# Patient Record
Sex: FEMALE | HISPANIC OR LATINO | URBAN - METROPOLITAN AREA
[De-identification: names, ages, dates, MRNs, and addresses within clinical notes are randomized per-mention and may not be internally consistent; named-entity substitution may affect disease eponyms.]

---

## 2020-09-12 ENCOUNTER — EMERGENCY (EMERGENCY)
Facility: HOSPITAL | Age: 50
LOS: 1 days | Discharge: HOME | End: 2020-09-12
Attending: STUDENT IN AN ORGANIZED HEALTH CARE EDUCATION/TRAINING PROGRAM
Payer: MEDICAID

## 2020-09-12 VITALS
HEIGHT: 65 IN | OXYGEN SATURATION: 98 % | SYSTOLIC BLOOD PRESSURE: 137 MMHG | RESPIRATION RATE: 16 BRPM | HEART RATE: 86 BPM | WEIGHT: 182.98 LBS | DIASTOLIC BLOOD PRESSURE: 87 MMHG | TEMPERATURE: 98 F

## 2020-09-12 LAB
APPEARANCE UR: CLEAR — SIGNIFICANT CHANGE UP
BACTERIA # UR AUTO: ABNORMAL /HPF
BILIRUB UR-MCNC: NEGATIVE — SIGNIFICANT CHANGE UP
COLOR SPEC: YELLOW — SIGNIFICANT CHANGE UP
COMMENT - URINE: SIGNIFICANT CHANGE UP
DIFF PNL FLD: NEGATIVE — SIGNIFICANT CHANGE UP
EPI CELLS # UR: ABNORMAL /HPF
GLUCOSE UR QL: NEGATIVE — SIGNIFICANT CHANGE UP
HCG UR QL: NEGATIVE — SIGNIFICANT CHANGE UP
KETONES UR-MCNC: NEGATIVE — SIGNIFICANT CHANGE UP
LEUKOCYTE ESTERASE UR-ACNC: NEGATIVE — SIGNIFICANT CHANGE UP
NITRITE UR-MCNC: NEGATIVE — SIGNIFICANT CHANGE UP
PH UR: 5 — SIGNIFICANT CHANGE UP (ref 5–8)
PROT UR-MCNC: 15
RBC CASTS # UR COMP ASSIST: SIGNIFICANT CHANGE UP /HPF (ref 0–2)
SP GR SPEC: 1.02 — SIGNIFICANT CHANGE UP (ref 1.01–1.02)
UROBILINOGEN FLD QL: NEGATIVE — SIGNIFICANT CHANGE UP
WBC UR QL: SIGNIFICANT CHANGE UP /HPF (ref 0–5)

## 2020-09-12 PROCEDURE — 76830 TRANSVAGINAL US NON-OB: CPT | Mod: 26

## 2020-09-12 PROCEDURE — 81001 URINALYSIS AUTO W/SCOPE: CPT

## 2020-09-12 PROCEDURE — 99285 EMERGENCY DEPT VISIT HI MDM: CPT | Mod: 25

## 2020-09-12 PROCEDURE — 76830 TRANSVAGINAL US NON-OB: CPT

## 2020-09-12 PROCEDURE — 81025 URINE PREGNANCY TEST: CPT

## 2020-09-12 PROCEDURE — 76856 US EXAM PELVIC COMPLETE: CPT

## 2020-09-12 PROCEDURE — 87086 URINE CULTURE/COLONY COUNT: CPT

## 2020-09-12 PROCEDURE — 76856 US EXAM PELVIC COMPLETE: CPT | Mod: 26

## 2020-09-12 PROCEDURE — 99284 EMERGENCY DEPT VISIT MOD MDM: CPT | Mod: 25

## 2020-09-12 NOTE — ED ADULT NURSE NOTE - NSIMPLEMENTINTERV_GEN_ALL_ED
Implemented All Universal Safety Interventions:  [place] to call system. Call bell, personal items and telephone within reach. Instruct patient to call for assistance. Room bathroom lighting operational. Non-slip footwear when patient is off stretcher. Physically safe environment: no spills, clutter or unnecessary equipment. Stretcher in lowest position, wheels locked, appropriate side rails in place.

## 2020-09-12 NOTE — ED PROVIDER NOTE - ATTENDING CONTRIBUTION TO CARE
Patient presenting with lower abd pain x 10 days  ambulatory  no ttp of abd, guarding or rebound  will obtain ua, assess pregnant, uti, us and reassess

## 2020-09-12 NOTE — ED PROVIDER NOTE - OBJECTIVE STATEMENT
51 yo female with no PMHx presenting to ED with complaints of pelvic/vaginal pain with radiation to the back for 10 days. Patient states she has had a Mirena placed for 5 yrs. But over the last 10 days she has been feeling sharp pain in her lower abdomen with radiation to her lower back. Denies fever, n/v, vaginal bleeding or discharge.  [id number]

## 2020-09-12 NOTE — ED PROVIDER NOTE - PROGRESS NOTE DETAILS
Patient has Myomatous uterus, will FU with GYN as an out patient. Pt is well appearing walking with steady gait, stable for discharge and follow up without fail with medical doctor. I had a detailed discussion with the patient and/or guardian regarding the historical points, exam findings, and any diagnostic results supporting the discharge diagnosis. Pt educated on care and need for follow up. Strict return instructions and red flag signs and symptoms discussed with patient. Questions answered. Pt shows understanding of discharge information and agrees to follow.

## 2020-09-12 NOTE — ED PROVIDER NOTE - PATIENT PORTAL LINK FT
You can access the FollowMyHealth Patient Portal offered by [facility] by registering at the following website: [email or web address]. By joining [facility]’s FollowMyHealth portal, you will also be able to view your health information using other applications (apps) compatible with our system.

## 2020-09-13 LAB
CULTURE RESULTS: SIGNIFICANT CHANGE UP
SPECIMEN SOURCE: SIGNIFICANT CHANGE UP

## 2022-03-03 PROBLEM — Z78.9 OTHER SPECIFIED HEALTH STATUS: Chronic | Status: ACTIVE | Noted: 2020-09-12

## 2022-04-08 ENCOUNTER — APPOINTMENT (OUTPATIENT)
Dept: INTERNAL MEDICINE | Facility: CLINIC | Age: 52
End: 2022-04-08
Payer: MEDICAID

## 2022-04-08 ENCOUNTER — LABORATORY RESULT (OUTPATIENT)
Age: 52
End: 2022-04-08

## 2022-04-08 ENCOUNTER — NON-APPOINTMENT (OUTPATIENT)
Age: 52
End: 2022-04-08

## 2022-04-08 VITALS
HEART RATE: 87 BPM | BODY MASS INDEX: 30.13 KG/M2 | WEIGHT: 192 LBS | DIASTOLIC BLOOD PRESSURE: 84 MMHG | RESPIRATION RATE: 12 BRPM | OXYGEN SATURATION: 96 % | HEIGHT: 66.93 IN | TEMPERATURE: 97.1 F | SYSTOLIC BLOOD PRESSURE: 128 MMHG

## 2022-04-08 DIAGNOSIS — Z00.00 ENCOUNTER FOR GENERAL ADULT MEDICAL EXAMINATION W/OUT ABNORMAL FINDINGS: ICD-10-CM

## 2022-04-08 DIAGNOSIS — J30.2 OTHER SEASONAL ALLERGIC RHINITIS: ICD-10-CM

## 2022-04-08 DIAGNOSIS — U07.1 COVID-19: ICD-10-CM

## 2022-04-08 DIAGNOSIS — Z72.89 OTHER PROBLEMS RELATED TO LIFESTYLE: ICD-10-CM

## 2022-04-08 DIAGNOSIS — Z82.49 FAMILY HISTORY OF ISCHEMIC HEART DISEASE AND OTHER DISEASES OF THE CIRCULATORY SYSTEM: ICD-10-CM

## 2022-04-08 DIAGNOSIS — Z78.9 OTHER SPECIFIED HEALTH STATUS: ICD-10-CM

## 2022-04-08 DIAGNOSIS — Z91.89 OTHER SPECIFIED PERSONAL RISK FACTORS, NOT ELSEWHERE CLASSIFIED: ICD-10-CM

## 2022-04-08 DIAGNOSIS — Z83.3 FAMILY HISTORY OF DIABETES MELLITUS: ICD-10-CM

## 2022-04-08 PROCEDURE — 99386 PREV VISIT NEW AGE 40-64: CPT | Mod: 25

## 2022-04-08 PROCEDURE — 93000 ELECTROCARDIOGRAM COMPLETE: CPT

## 2022-04-08 NOTE — PHYSICAL EXAM

## 2022-04-09 LAB
APPEARANCE: CLEAR
BILIRUBIN URINE: NEGATIVE
BLOOD URINE: NEGATIVE
COLOR: YELLOW
GLUCOSE QUALITATIVE U: NEGATIVE
KETONES URINE: NEGATIVE
LEUKOCYTE ESTERASE URINE: ABNORMAL
NITRITE URINE: NEGATIVE
PH URINE: 6.5
PROTEIN URINE: NORMAL
SPECIFIC GRAVITY URINE: 1.02
UROBILINOGEN URINE: NORMAL

## 2022-04-12 LAB
25(OH)D3 SERPL-MCNC: 20.2 NG/ML
ALBUMIN SERPL ELPH-MCNC: 4.3 G/DL
ALP BLD-CCNC: 156 U/L
ALT SERPL-CCNC: 37 U/L
ANION GAP SERPL CALC-SCNC: 21 MMOL/L
AST SERPL-CCNC: 32 U/L
BILIRUB SERPL-MCNC: 0.3 MG/DL
BUN SERPL-MCNC: 11 MG/DL
CALCIUM SERPL-MCNC: 9.5 MG/DL
CHLORIDE SERPL-SCNC: 105 MMOL/L
CHOLEST SERPL-MCNC: 186 MG/DL
CO2 SERPL-SCNC: 17 MMOL/L
CREAT SERPL-MCNC: 0.67 MG/DL
EGFR: 106 ML/MIN/1.73M2
GLUCOSE SERPL-MCNC: 57 MG/DL
HDLC SERPL-MCNC: 58 MG/DL
LDLC SERPL CALC-MCNC: 117 MG/DL
NONHDLC SERPL-MCNC: 128 MG/DL
POTASSIUM SERPL-SCNC: 4.7 MMOL/L
PROT SERPL-MCNC: 7.6 G/DL
SODIUM SERPL-SCNC: 142 MMOL/L
TRIGL SERPL-MCNC: 57 MG/DL
TSH SERPL-ACNC: 1.62 UIU/ML

## 2022-04-13 LAB
BASOPHILS # BLD AUTO: 0.05 K/UL
BASOPHILS NFR BLD AUTO: 0.8 %
EOSINOPHIL # BLD AUTO: 0.26 K/UL
EOSINOPHIL NFR BLD AUTO: 4 %
HCT VFR BLD CALC: 53.8 %
HGB BLD-MCNC: 16.6 G/DL
IMM GRANULOCYTES NFR BLD AUTO: 0.3 %
LYMPHOCYTES # BLD AUTO: 1.9 K/UL
LYMPHOCYTES NFR BLD AUTO: 29.4 %
MAN DIFF?: NORMAL
MCHC RBC-ENTMCNC: 26.7 PG
MCHC RBC-ENTMCNC: 30.9 GM/DL
MCV RBC AUTO: 86.5 FL
MONOCYTES # BLD AUTO: 0.7 K/UL
MONOCYTES NFR BLD AUTO: 10.8 %
NEUTROPHILS # BLD AUTO: 3.53 K/UL
NEUTROPHILS NFR BLD AUTO: 54.7 %
PLATELET # BLD AUTO: 263 K/UL
RBC # BLD: 6.22 M/UL
RBC # FLD: 14.5 %
WBC # FLD AUTO: 6.46 K/UL

## 2022-05-27 ENCOUNTER — APPOINTMENT (OUTPATIENT)
Dept: INTERNAL MEDICINE | Facility: CLINIC | Age: 52
End: 2022-05-27

## 2022-07-01 ENCOUNTER — APPOINTMENT (OUTPATIENT)
Dept: GASTROENTEROLOGY | Facility: CLINIC | Age: 52
End: 2022-07-01

## 2022-08-19 ENCOUNTER — APPOINTMENT (OUTPATIENT)
Dept: INTERNAL MEDICINE | Facility: CLINIC | Age: 52
End: 2022-08-19

## 2022-10-04 ENCOUNTER — APPOINTMENT (OUTPATIENT)
Dept: INTERNAL MEDICINE | Facility: CLINIC | Age: 52
End: 2022-10-04

## 2022-10-04 VITALS
DIASTOLIC BLOOD PRESSURE: 87 MMHG | WEIGHT: 190 LBS | HEIGHT: 66.93 IN | SYSTOLIC BLOOD PRESSURE: 145 MMHG | BODY MASS INDEX: 29.82 KG/M2 | TEMPERATURE: 97.7 F | HEART RATE: 93 BPM | OXYGEN SATURATION: 98 % | RESPIRATION RATE: 12 BRPM

## 2022-10-04 DIAGNOSIS — Z00.8 ENCOUNTER FOR OTHER GENERAL EXAMINATION: ICD-10-CM

## 2022-10-04 DIAGNOSIS — Z11.1 ENCOUNTER FOR SCREENING FOR RESPIRATORY TUBERCULOSIS: ICD-10-CM

## 2022-10-04 DIAGNOSIS — Z23 ENCOUNTER FOR IMMUNIZATION: ICD-10-CM

## 2022-10-04 LAB
ALBUMIN SERPL ELPH-MCNC: 4.4 G/DL
ALP BLD-CCNC: 172 U/L
ALT SERPL-CCNC: 36 U/L
ANION GAP SERPL CALC-SCNC: 15 MMOL/L
AST SERPL-CCNC: 27 U/L
BASOPHILS # BLD AUTO: 0.06 K/UL
BASOPHILS NFR BLD AUTO: 1 %
BILIRUB SERPL-MCNC: 0.5 MG/DL
BUN SERPL-MCNC: 15 MG/DL
CALCIUM SERPL-MCNC: 9.6 MG/DL
CHLORIDE SERPL-SCNC: 103 MMOL/L
CO2 SERPL-SCNC: 22 MMOL/L
CREAT SERPL-MCNC: 0.67 MG/DL
EGFR: 106 ML/MIN/1.73M2
EOSINOPHIL # BLD AUTO: 0.2 K/UL
EOSINOPHIL NFR BLD AUTO: 3.4 %
GLUCOSE SERPL-MCNC: 111 MG/DL
HCT VFR BLD CALC: 51.7 %
HGB BLD-MCNC: 16.5 G/DL
IMM GRANULOCYTES NFR BLD AUTO: 0.2 %
LYMPHOCYTES # BLD AUTO: 2.18 K/UL
LYMPHOCYTES NFR BLD AUTO: 36.9 %
MAN DIFF?: NORMAL
MCHC RBC-ENTMCNC: 26.8 PG
MCHC RBC-ENTMCNC: 31.9 GM/DL
MCV RBC AUTO: 83.9 FL
MONOCYTES # BLD AUTO: 0.65 K/UL
MONOCYTES NFR BLD AUTO: 11 %
NEUTROPHILS # BLD AUTO: 2.81 K/UL
NEUTROPHILS NFR BLD AUTO: 47.5 %
PLATELET # BLD AUTO: 273 K/UL
POTASSIUM SERPL-SCNC: 3.9 MMOL/L
PROT SERPL-MCNC: 7.7 G/DL
RBC # BLD: 6.16 M/UL
RBC # FLD: 13.6 %
SODIUM SERPL-SCNC: 140 MMOL/L
WBC # FLD AUTO: 5.91 K/UL

## 2022-10-04 PROCEDURE — 90715 TDAP VACCINE 7 YRS/> IM: CPT

## 2022-10-04 PROCEDURE — 90471 IMMUNIZATION ADMIN: CPT

## 2022-10-04 NOTE — ASSESSMENT
[FreeTextEntry1] : POLYCYTHEMIA= LABS \par FORM FOR WORK,LABS AND TD TODAY \par TB SCREENING \par RTO AS PER RESULTS

## 2022-10-04 NOTE — PHYSICAL EXAM
[Scar] : a scar was noted [Soft, Nontender] : the abdomen was soft and nontender [Normal] : normal to percussion

## 2022-10-04 NOTE — HISTORY OF PRESENT ILLNESS
[de-identified] : PT COMES FOR WORK PHYSICAL EXAM AND LABS \par SEEN BY ONCOLOGY X2 FOR OVARIAN CYST S/P Aspiration = NEG FOR MALIGNANCY ;TOLD BY HIM TO SEE ME FOR ABNORMAL LABS BUT DID NOT SPECIFY OR GAVE PT REPORTS

## 2022-10-05 LAB
MEV IGG FLD QL IA: 280 AU/ML
MEV IGG+IGM SER-IMP: POSITIVE
MUV AB SER-ACNC: POSITIVE
MUV IGG SER QL IA: 98.4 AU/ML
RUBV IGG FLD-ACNC: 3.2 INDEX
RUBV IGG SER-IMP: POSITIVE
VZV AB TITR SER: POSITIVE
VZV IGG SER IF-ACNC: 624.1 INDEX

## 2022-10-06 LAB
M TB IFN-G BLD-IMP: NEGATIVE
QUANTIFERON TB PLUS MITOGEN MINUS NIL: >10 IU/ML
QUANTIFERON TB PLUS NIL: 0.03 IU/ML
QUANTIFERON TB PLUS TB1 MINUS NIL: 0.01 IU/ML
QUANTIFERON TB PLUS TB2 MINUS NIL: 0.01 IU/ML

## 2022-10-24 LAB
AMPHET UR-MCNC: NEGATIVE
BARBITURATES UR-MCNC: NEGATIVE
BENZODIAZ UR-MCNC: NEGATIVE
COCAINE METAB.OTHER UR-MCNC: NEGATIVE
CREATININE, URINE: 144.4 MG/DL
METHADONE UR-MCNC: NEGATIVE
METHAQUALONE UR-MCNC: NEGATIVE
OPIATES UR-MCNC: NEGATIVE
PCP UR-MCNC: NEGATIVE
PROPOXYPH UR QL: NEGATIVE
THC UR QL: NEGATIVE

## 2022-11-25 ENCOUNTER — APPOINTMENT (OUTPATIENT)
Dept: INTERNAL MEDICINE | Facility: CLINIC | Age: 52
End: 2022-11-25

## 2022-12-20 ENCOUNTER — APPOINTMENT (OUTPATIENT)
Dept: INTERNAL MEDICINE | Facility: CLINIC | Age: 52
End: 2022-12-20

## 2022-12-20 VITALS
WEIGHT: 188 LBS | DIASTOLIC BLOOD PRESSURE: 83 MMHG | BODY MASS INDEX: 29.51 KG/M2 | HEART RATE: 93 BPM | RESPIRATION RATE: 12 BRPM | OXYGEN SATURATION: 99 % | HEIGHT: 66.93 IN | SYSTOLIC BLOOD PRESSURE: 153 MMHG

## 2022-12-20 VITALS — SYSTOLIC BLOOD PRESSURE: 145 MMHG | DIASTOLIC BLOOD PRESSURE: 80 MMHG

## 2022-12-20 PROCEDURE — 99214 OFFICE O/P EST MOD 30 MIN: CPT

## 2022-12-20 NOTE — HISTORY OF PRESENT ILLNESS
[de-identified] : COMES FOR F/U \par SEEN BY GI BUT LOST REFERRAL AND WILL LIKE 2ND OPINION \par HAD NOT SEEN HEMATOLOGY YET \par CONCERN ABOUT ELEVATED BP READINGS

## 2022-12-20 NOTE — ASSESSMENT
[FreeTextEntry1] : 52 Y OLD FEM WITH BORDERLINE HTN = DIET AND EXERCISE \par POLYCYTHEMIA = HEMATOLOGY REF\par SCREENING COLON CA= GI EVAL \par RTO 4 M

## 2022-12-20 NOTE — PHYSICAL EXAM
[No CVA Tenderness] : no CVA  tenderness [Coordination Grossly Intact] : coordination grossly intact [Normal] : affect was normal and insight and judgment were intact

## 2022-12-23 ENCOUNTER — APPOINTMENT (OUTPATIENT)
Dept: GASTROENTEROLOGY | Facility: CLINIC | Age: 52
End: 2022-12-23

## 2022-12-23 VITALS
HEIGHT: 66.93 IN | HEART RATE: 98 BPM | SYSTOLIC BLOOD PRESSURE: 130 MMHG | BODY MASS INDEX: 29.51 KG/M2 | DIASTOLIC BLOOD PRESSURE: 76 MMHG | WEIGHT: 188 LBS | OXYGEN SATURATION: 95 % | RESPIRATION RATE: 12 BRPM

## 2022-12-23 DIAGNOSIS — Z12.11 ENCOUNTER FOR SCREENING FOR MALIGNANT NEOPLASM OF COLON: ICD-10-CM

## 2022-12-23 DIAGNOSIS — D58.2 OTHER HEMOGLOBINOPATHIES: ICD-10-CM

## 2022-12-23 PROCEDURE — 99203 OFFICE O/P NEW LOW 30 MIN: CPT

## 2022-12-23 NOTE — ASSESSMENT
[FreeTextEntry1] : 51 yo female, requests colon cancer screening. Has Hx of peritonitis s/p AP. Had evaluation in [place], but did not bring records. Via  tanya had benign ovarian cyst removed. There is no weight loss, no change in bowel habits, no family hx of colon cancer. There is no rectal bleeding.\par \par IMP:\par \par 1. average risk for colon cancer screening\par 2. hx ovarian cyst\par 3. hx of peritonitis\par \par PLAN:\par \par She was advised to undergo colonoscopy to which she  agreed. The procedure will be performed in [facility] with the assistance of an anesthesiologist. The procedure was explained in detail and she understood the risks of the procedure not limited  to infection, bleeding, perforation, risk of anesthesia and risk of IV site problems,emergency surgery, missed lesions  or non-diagnosis of colon cancer. She  was advised that she could not drive home alone, if the patient chooses to receive sedation. Further diagnostic and treatment recommendations will be based upon the procedure and any biopsies, if they are taken.\par \par pt will forward previous records to me

## 2022-12-23 NOTE — HISTORY OF PRESENT ILLNESS
[FreeTextEntry1] : 51 yo female, requests colon cancer screening. Has Hx of peritonitis s/p AP. Had evaluation in [place], but did not bring records. Via  tanya had benign ovarian cyst removed. There is no weight loss, no change in bowel habits, no family hx of colon cancer. There is no rectal bleeding.

## 2022-12-23 NOTE — PHYSICAL EXAM
[Alert] : alert [Normal Voice/Communication] : normal voice/communication [Healthy Appearing] : healthy appearing [No Acute Distress] : no acute distress [Sclera] : the sclera and conjunctiva were normal [Hearing Threshold Finger Rub Not [place]] : hearing was normal [Normal Lips/Gums] : the lips and gums were normal [Oropharynx] : the oropharynx was normal [Normal Appearance] : the appearance of the neck was normal [No Neck Mass] : no neck mass was observed [No Respiratory Distress] : no respiratory distress [No Acc Muscle Use] : no accessory muscle use [Respiration, Rhythm And Depth] : normal respiratory rhythm and effort [Heart Rate And Rhythm] : heart rate was normal and rhythm regular [Auscultation Breath Sounds / Voice Sounds] : lungs were clear to auscultation bilaterally [Normal S1, S2] : normal S1 and S2 [Murmurs] : no murmurs [Bowel Sounds] : normal bowel sounds [Abdomen Tenderness] : non-tender [No Masses] : no abdominal mass palpated [Abdomen Soft] : soft [] : no hepatosplenomegaly [Oriented To Time, Place, And Person] : oriented to person, place, and time

## 2023-02-27 ENCOUNTER — APPOINTMENT (OUTPATIENT)
Dept: GASTROENTEROLOGY | Facility: AMBULATORY SURGERY CENTER | Age: 53
End: 2023-02-27
Payer: MEDICAID

## 2023-02-27 PROCEDURE — 45378 DIAGNOSTIC COLONOSCOPY: CPT

## 2023-02-27 RX ORDER — POLYETHYLENE GLYCOL-3350 AND ELECTROLYTES 236; 6.74; 5.86; 2.97; 22.74 G/274.31G; G/274.31G; G/274.31G; G/274.31G; G/274.31G
236 POWDER, FOR SOLUTION ORAL
Qty: 4000 | Refills: 0 | Status: COMPLETED | COMMUNITY
Start: 2023-02-23 | End: 2023-02-27

## 2023-04-20 ENCOUNTER — APPOINTMENT (OUTPATIENT)
Dept: INTERNAL MEDICINE | Facility: CLINIC | Age: 53
End: 2023-04-20

## 2023-09-06 ENCOUNTER — APPOINTMENT (OUTPATIENT)
Dept: CARDIOLOGY | Facility: CLINIC | Age: 53
End: 2023-09-06
Payer: COMMERCIAL

## 2023-09-06 ENCOUNTER — NON-APPOINTMENT (OUTPATIENT)
Age: 53
End: 2023-09-06

## 2023-09-06 VITALS
SYSTOLIC BLOOD PRESSURE: 144 MMHG | WEIGHT: 176 LBS | HEART RATE: 94 BPM | BODY MASS INDEX: 27.62 KG/M2 | TEMPERATURE: 97.2 F | OXYGEN SATURATION: 98 % | DIASTOLIC BLOOD PRESSURE: 84 MMHG | RESPIRATION RATE: 14 BRPM

## 2023-09-06 LAB
ALBUMIN SERPL ELPH-MCNC: 4.7 G/DL
ALP BLD-CCNC: 167 U/L
ALT SERPL-CCNC: 35 U/L
ANION GAP SERPL CALC-SCNC: 14 MMOL/L
APPEARANCE: CLEAR
AST SERPL-CCNC: 25 U/L
BILIRUB SERPL-MCNC: 0.4 MG/DL
BILIRUBIN URINE: NEGATIVE
BLOOD URINE: NEGATIVE
BUN SERPL-MCNC: 14 MG/DL
CALCIUM SERPL-MCNC: 10 MG/DL
CHLORIDE SERPL-SCNC: 103 MMOL/L
CO2 SERPL-SCNC: 24 MMOL/L
COLOR: YELLOW
CREAT SERPL-MCNC: 0.67 MG/DL
EGFR: 105 ML/MIN/1.73M2
GLUCOSE QUALITATIVE U: NEGATIVE MG/DL
GLUCOSE SERPL-MCNC: 97 MG/DL
HCG SERPL-MCNC: 2 MIU/ML
HCT VFR BLD CALC: 49.4 %
HGB BLD-MCNC: 15.7 G/DL
INR PPP: 0.93 RATIO
KETONES URINE: NEGATIVE MG/DL
LEUKOCYTE ESTERASE URINE: NEGATIVE
MCHC RBC-ENTMCNC: 27.4 PG
MCHC RBC-ENTMCNC: 31.8 GM/DL
MCV RBC AUTO: 86.4 FL
NITRITE URINE: NEGATIVE
PH URINE: 6
PLATELET # BLD AUTO: 251 K/UL
POTASSIUM SERPL-SCNC: 4.2 MMOL/L
PROT SERPL-MCNC: 7.3 G/DL
PROTEIN URINE: NEGATIVE MG/DL
PT BLD: 10.5 SEC
RBC # BLD: 5.72 M/UL
RBC # FLD: 14.2 %
SODIUM SERPL-SCNC: 142 MMOL/L
SPECIFIC GRAVITY URINE: 1.02
UROBILINOGEN URINE: 0.2 MG/DL
WBC # FLD AUTO: 5.54 K/UL

## 2023-09-06 PROCEDURE — 99203 OFFICE O/P NEW LOW 30 MIN: CPT | Mod: 25

## 2023-09-06 PROCEDURE — 93000 ELECTROCARDIOGRAM COMPLETE: CPT

## 2023-09-06 NOTE — HISTORY OF PRESENT ILLNESS
[Preoperative Visit] : for a medical evaluation prior to surgery [Scheduled Procedure ___] : a [unfilled] [Date of Surgery ___] : on [unfilled] [Surgeon Name ___] : surgeon: [unfilled] [FreeTextEntry1] : Kelsey is a 53-year-old Bangladeshi speaking female who presents for clearance prior to abdominal surgery as defined. No CP, palpitations or SOB. No other medical problems. She is admittedly very nervous today.

## 2023-09-06 NOTE — DISCUSSION/SUMMARY
[Procedure Intermediate Risk] : the procedure risk is intermediate [Patient Low Risk] : the patient is a low surgical risk [Optimized for Surgery] : the patient is optimized for surgery [FreeTextEntry1] : The patient is a 53-year-old Gambian speaking female awaiting surgery who is asymptomatic.  #1 CV- normal ECG but tachycardia secondary to anxiety which improved during our visit.  #2 General- There are no cardiac contraindications to exp. lap, with extensive lysis of adhesions as planned. Labs were sent for Dr. Lal prior to their visit tomorrow.

## 2023-09-06 NOTE — PHYSICAL EXAM
[General Appearance - Well Developed] : well developed [Normal Appearance] : normal appearance [Well Groomed] : well groomed [General Appearance - Well Nourished] : well nourished [No Deformities] : no deformities [General Appearance - In No Acute Distress] : no acute distress [Normal Conjunctiva] : the conjunctiva exhibited no abnormalities [Eyelids - No Xanthelasma] : the eyelids demonstrated no xanthelasmas [Normal Oral Mucosa] : normal oral mucosa [No Oral Pallor] : no oral pallor [No Oral Cyanosis] : no oral cyanosis [Normal Jugular Venous A Waves Present] : normal jugular venous A waves present [Normal Jugular Venous V Waves Present] : normal jugular venous V waves present [No Jugular Venous [place] A Waves] : no jugular venous [place] A waves [Respiration, Rhythm And Depth] : normal respiratory rhythm and effort [Exaggerated Use Of Accessory Muscles For Inspiration] : no accessory muscle use [Auscultation Breath Sounds / Voice Sounds] : lungs were clear to auscultation bilaterally [Heart Rate And Rhythm] : heart rate and rhythm were normal [Heart Sounds] : normal S1 and S2 [Murmurs] : no murmurs present [Abdomen Soft] : soft [Abdomen Tenderness] : non-tender [Abdomen Mass (___ Cm)] : no abdominal mass palpated [Abnormal Walk] : normal gait [Gait - Sufficient For Exercise Testing] : the gait was sufficient for exercise testing [Nail Clubbing] : no clubbing of the fingernails [Cyanosis, Localized] : no localized cyanosis [Petechial Hemorrhages (___cm)] : no petechial hemorrhages [Skin Color & Pigmentation] : normal skin color and pigmentation [] : no rash [No Venous Stasis] : no venous stasis [Skin Lesions] : no skin lesions [No Skin Ulcers] : no skin ulcer [No Xanthoma] : no  xanthoma was observed [Oriented To Time, Place, And Person] : oriented to person, place, and time [Affect] : the affect was normal [Mood] : the mood was normal [No Anxiety] : not feeling anxious

## 2023-09-07 ENCOUNTER — APPOINTMENT (OUTPATIENT)
Dept: INTERNAL MEDICINE | Facility: CLINIC | Age: 53
End: 2023-09-07
Payer: COMMERCIAL

## 2023-09-07 VITALS
TEMPERATURE: 97 F | HEIGHT: 66.93 IN | DIASTOLIC BLOOD PRESSURE: 77 MMHG | HEART RATE: 91 BPM | SYSTOLIC BLOOD PRESSURE: 129 MMHG | BODY MASS INDEX: 29.03 KG/M2 | OXYGEN SATURATION: 96 % | WEIGHT: 185 LBS | RESPIRATION RATE: 14 BRPM

## 2023-09-07 DIAGNOSIS — D75.1 SECONDARY POLYCYTHEMIA: ICD-10-CM

## 2023-09-07 DIAGNOSIS — D25.9 LEIOMYOMA OF UTERUS, UNSPECIFIED: ICD-10-CM

## 2023-09-07 DIAGNOSIS — N83.209 UNSPECIFIED OVARIAN CYST, UNSPECIFIED SIDE: ICD-10-CM

## 2023-09-07 DIAGNOSIS — Z01.818 ENCOUNTER FOR OTHER PREPROCEDURAL EXAMINATION: ICD-10-CM

## 2023-09-07 LAB — ABO + RH PNL BLD: NORMAL

## 2023-09-07 PROCEDURE — 99215 OFFICE O/P EST HI 40 MIN: CPT

## 2023-09-07 NOTE — REVIEW OF SYSTEMS
[Abdominal Pain] : abdominal pain [Nocturia] : nocturia [Frequency] : frequency [Negative] : Heme/Lymph

## 2023-09-07 NOTE — HISTORY OF PRESENT ILLNESS
[No Pertinent Cardiac History] : no history of aortic stenosis, atrial fibrillation, coronary artery disease, recent myocardial infarction, or implantable device/pacemaker [No Pertinent Pulmonary History] : no history of asthma, COPD, sleep apnea, or smoking [No Adverse Anesthesia Reaction] : no adverse anesthesia reaction in self or family member [FreeTextEntry1] : LAUREN ,LEFT OOPHORECTOMY AND LYSIS OF ADHESIONS  [FreeTextEntry2] : 9/28/23 [FreeTextEntry3] : DR FISCHER-  [FreeTextEntry4] : PT WILL UNDERGO LAPARATOMY ,HYSTECTOMY AND LYSIS OF ADHESIONS S/P ABD SURGERY AND WITH UTERINE FIBROID WITH  SX

## 2023-09-07 NOTE — PHYSICAL EXAM
[No Acute Distress] : no acute distress [Normal Sclera/Conjunctiva] : normal sclera/conjunctiva [Rounded] : rounded [Normal] : normal [Scar] : a scar was noted [Soft, Nontender] : the abdomen was soft and nontender [No Mass] : no masses were palpated [No HSM] : no hepatosplenomegaly noted [No Joint Swelling] : no joint swelling [No Rash] : no rash [Coordination Grossly Intact] : coordination grossly intact [No Focal Deficits] : no focal deficits [Normal Gait] : normal gait [Alert and Oriented x3] : oriented to person, place, and time

## 2023-09-07 NOTE — ASSESSMENT
[Patient Optimized for Surgery] : Patient optimized for surgery [No Further Testing Recommended] : no further testing recommended [As per surgery] : as per surgery [FreeTextEntry4] : 53 Y OLD FEM WITH MILD POLYCYTHEMIA AT ACCEPTABLE RISK FOR SURGERY

## 2023-09-27 ENCOUNTER — TRANSCRIPTION ENCOUNTER (OUTPATIENT)
Age: 53
End: 2023-09-27

## 2023-09-27 VITALS
RESPIRATION RATE: 16 BRPM | OXYGEN SATURATION: 98 % | WEIGHT: 179.24 LBS | SYSTOLIC BLOOD PRESSURE: 136 MMHG | TEMPERATURE: 97 F | HEIGHT: 65 IN | HEART RATE: 103 BPM | DIASTOLIC BLOOD PRESSURE: 73 MMHG

## 2023-09-27 RX ORDER — HEPARIN SODIUM 5000 [USP'U]/ML
5000 INJECTION INTRAVENOUS; SUBCUTANEOUS ONCE
Refills: 0 | Status: COMPLETED | OUTPATIENT
Start: 2023-09-28 | End: 2023-09-28

## 2023-09-27 RX ORDER — CELECOXIB 200 MG/1
400 CAPSULE ORAL ONCE
Refills: 0 | Status: COMPLETED | OUTPATIENT
Start: 2023-09-28 | End: 2023-09-28

## 2023-09-27 RX ORDER — ACETAMINOPHEN 500 MG
1000 TABLET ORAL ONCE
Refills: 0 | Status: COMPLETED | OUTPATIENT
Start: 2023-09-28 | End: 2023-09-28

## 2023-09-27 NOTE — PRE-OP CHECKLIST - 2.
Pt took Neomycin 500mg and Metronidazole 500mg as per Dr. Granados - Pt had vomitting, diarrhea and headache

## 2023-09-27 NOTE — PATIENT PROFILE ADULT - FALL HARM RISK - UNIVERSAL INTERVENTIONS
Bed in lowest position, wheels locked, appropriate side rails in place/Call bell, personal items and telephone in reach/Instruct patient to call for assistance before getting out of bed or chair/Non-slip footwear when patient is out of bed/[place] to call system/Physically safe environment - no spills, clutter or unnecessary equipment/Purposeful Proactive Rounding/Room/bathroom lighting operational, light cord in reach

## 2023-09-28 ENCOUNTER — TRANSCRIPTION ENCOUNTER (OUTPATIENT)
Age: 53
End: 2023-09-28

## 2023-09-28 ENCOUNTER — INPATIENT (INPATIENT)
Facility: HOSPITAL | Age: 53
LOS: 2 days | Discharge: HOME | DRG: 354 | End: 2023-10-01
Attending: SURGERY | Admitting: SURGERY
Payer: COMMERCIAL

## 2023-09-28 DIAGNOSIS — Z98.890 OTHER SPECIFIED POSTPROCEDURAL STATES: Chronic | ICD-10-CM

## 2023-09-28 DIAGNOSIS — Z90.721 ACQUIRED ABSENCE OF OVARIES, UNILATERAL: Chronic | ICD-10-CM

## 2023-09-28 DIAGNOSIS — Z87.81 PERSONAL HISTORY OF (HEALED) TRAUMATIC FRACTURE: Chronic | ICD-10-CM

## 2023-09-28 DIAGNOSIS — Z90.49 ACQUIRED ABSENCE OF OTHER SPECIFIED PARTS OF DIGESTIVE TRACT: Chronic | ICD-10-CM

## 2023-09-28 LAB
BLD GP AB SCN SERPL QL: NEGATIVE — SIGNIFICANT CHANGE UP
RH IG SCN BLD-IMP: POSITIVE — SIGNIFICANT CHANGE UP

## 2023-09-28 PROCEDURE — 52005 CYSTO W/URTRL CATHJ: CPT

## 2023-09-28 PROCEDURE — 88304 TISSUE EXAM BY PATHOLOGIST: CPT | Mod: 26

## 2023-09-28 PROCEDURE — 88307 TISSUE EXAM BY PATHOLOGIST: CPT | Mod: 26

## 2023-09-28 DEVICE — SEPRAFILM 3 X 5": Type: IMPLANTABLE DEVICE | Status: FUNCTIONAL

## 2023-09-28 DEVICE — MESH HERNIA VENTRAL / INCISIONAL PARIETEX PROGRIP 30 X 15CM: Type: IMPLANTABLE DEVICE | Status: FUNCTIONAL

## 2023-09-28 RX ORDER — SODIUM CHLORIDE 9 MG/ML
500 INJECTION, SOLUTION INTRAVENOUS ONCE
Refills: 0 | Status: COMPLETED | OUTPATIENT
Start: 2023-09-28 | End: 2023-09-28

## 2023-09-28 RX ORDER — ONDANSETRON 8 MG/1
4 TABLET, FILM COATED ORAL EVERY 6 HOURS
Refills: 0 | Status: DISCONTINUED | OUTPATIENT
Start: 2023-09-28 | End: 2023-10-01

## 2023-09-28 RX ORDER — HEPARIN SODIUM 5000 [USP'U]/ML
5000 INJECTION INTRAVENOUS; SUBCUTANEOUS EVERY 8 HOURS
Refills: 0 | Status: DISCONTINUED | OUTPATIENT
Start: 2023-09-28 | End: 2023-10-01

## 2023-09-28 RX ORDER — ACETAMINOPHEN 500 MG
650 TABLET ORAL EVERY 6 HOURS
Refills: 0 | Status: DISCONTINUED | OUTPATIENT
Start: 2023-09-28 | End: 2023-10-01

## 2023-09-28 RX ORDER — ACETAMINOPHEN 500 MG
1000 TABLET ORAL ONCE
Refills: 0 | Status: COMPLETED | OUTPATIENT
Start: 2023-09-28 | End: 2023-09-28

## 2023-09-28 RX ORDER — OXYCODONE HYDROCHLORIDE 5 MG/1
10 TABLET ORAL EVERY 6 HOURS
Refills: 0 | Status: DISCONTINUED | OUTPATIENT
Start: 2023-09-28 | End: 2023-09-29

## 2023-09-28 RX ORDER — OXYCODONE HYDROCHLORIDE 5 MG/1
5 TABLET ORAL EVERY 6 HOURS
Refills: 0 | Status: DISCONTINUED | OUTPATIENT
Start: 2023-09-28 | End: 2023-09-29

## 2023-09-28 RX ORDER — SODIUM CHLORIDE 9 MG/ML
1000 INJECTION, SOLUTION INTRAVENOUS
Refills: 0 | Status: DISCONTINUED | OUTPATIENT
Start: 2023-09-28 | End: 2023-09-30

## 2023-09-28 RX ORDER — HYDROMORPHONE HYDROCHLORIDE 2 MG/ML
0.25 INJECTION INTRAMUSCULAR; INTRAVENOUS; SUBCUTANEOUS
Refills: 0 | Status: DISCONTINUED | OUTPATIENT
Start: 2023-09-28 | End: 2023-09-29

## 2023-09-28 RX ADMIN — SODIUM CHLORIDE 1000 MILLILITER(S): 9 INJECTION, SOLUTION INTRAVENOUS at 21:58

## 2023-09-28 RX ADMIN — HYDROMORPHONE HYDROCHLORIDE 0.25 MILLIGRAM(S): 2 INJECTION INTRAMUSCULAR; INTRAVENOUS; SUBCUTANEOUS at 21:24

## 2023-09-28 RX ADMIN — Medication 1000 MILLIGRAM(S): at 21:58

## 2023-09-28 RX ADMIN — Medication 400 MILLIGRAM(S): at 21:40

## 2023-09-28 RX ADMIN — HEPARIN SODIUM 5000 UNIT(S): 5000 INJECTION INTRAVENOUS; SUBCUTANEOUS at 14:11

## 2023-09-28 RX ADMIN — OXYCODONE HYDROCHLORIDE 10 MILLIGRAM(S): 5 TABLET ORAL at 23:53

## 2023-09-28 RX ADMIN — CELECOXIB 400 MILLIGRAM(S): 200 CAPSULE ORAL at 14:11

## 2023-09-28 RX ADMIN — Medication 1000 MILLIGRAM(S): at 14:11

## 2023-09-28 RX ADMIN — HYDROMORPHONE HYDROCHLORIDE 0.25 MILLIGRAM(S): 2 INJECTION INTRAMUSCULAR; INTRAVENOUS; SUBCUTANEOUS at 21:58

## 2023-09-28 RX ADMIN — OXYCODONE HYDROCHLORIDE 10 MILLIGRAM(S): 5 TABLET ORAL at 22:53

## 2023-09-28 NOTE — BRIEF OPERATIVE NOTE - OPERATION/FINDINGS
Entry by gen surg - see their op note for details. Large fibroid uterus, 20w size. Abdominal AUGUSTINA/BSO using Ligasure. Cervical cuff closed with Vicryl. Excellent hemostasis. Bilateral ureters palpated distant for surgical sites. Remainder of case per gen surg.
Pre-op ureteral open ended catheter placement (5Fr yellow L ureter, Sibley whistle tip catheter R ureter)
Patient prepped and draped in stirrups, please see Urology note for operative findings of Urologic stent placement. Patient supine prepped and draped in sterile fashion. Midline incision made from umbilicus to pubic symphysis, incision dissected with electrocautery to hernia sac, defect appreciated in rectus muscle. No bowel ischemia noted. Supracervical abdominal hysterectomy with bilateral salpingo-oopherectomy completed by OBGYN, please see operative note for operative findings. Abdomen washed out thoroughly until irrigation clear. Posterior rectus fascia released and primarily re-approximated with nylon suture. Pro- mesh fixed with nylon suture. Rectus sheath closed primarily. Fascia closed with nylon. Deep dermals with vicryl. Skin closed with absorbable subcuticular skin staples.

## 2023-09-28 NOTE — H&P ADULT - NSICDXPASTSURGICALHX_GEN_ALL_CORE_FT
PAST SURGICAL HISTORY:  H/O inguinal hernia repair     H/O rhinoplasty     H/O unilateral oophorectomy left    History of appendectomy     History of fracture of clavicle left clavicle surgery

## 2023-09-28 NOTE — H&P ADULT - NSHPPHYSICALEXAM_GEN_ALL_CORE
T(C): 36.3 (09-28-23 @ 13:26), Max: 36.3 (09-28-23 @ 13:26)  HR: 103 (09-28-23 @ 13:26) (103 - 103)  BP: 136/73 (09-28-23 @ 13:26) (136/73 - 136/73)  RR: 16 (09-28-23 @ 13:26) (16 - 16)  SpO2: 98% (09-28-23 @ 13:26) (98% - 98%)    CONSTITUTIONAL: Well groomed, no apparent distress  EYES: PERRLA and symmetric, EOMI, No conjunctival or scleral injection, non-icteric  ENMT: Oral mucosa with moist membranes. Normal dentition; no pharyngeal injection or exudates             NECK: Supple, symmetric and without tracheal deviation   RESP: No respiratory distress, no use of accessory muscles; CTA b/l, no WRR  CV: RRR, +S1S2, no MRG; no JVD; no peripheral edema  GI: incisional hernia palpated at midline, soft, distended, mild tenderness to deep palpation  LYMPH: No cervical LAD or tenderness; no axillary LAD or tenderness; no inguinal LAD or tenderness  MSK: Normal gait; No digital clubbing or cyanosis; examination of the (head/neck/spine/ribs/pelvis, RUE, LUE, RLE, LLE) without misalignment,            Normal ROM without pain, no spinal tenderness, normal muscle strength/tone  SKIN: No rashes or ulcers noted; no subcutaneous nodules or induration palpable  NEURO: CN II-XII intact; normal reflexes in upper and lower extremities, sensation intact in upper and lower extremities b/l to light touch   PSYCH: Appropriate insight/judgment; A+O x 3, mood and affect appropriate, recent/remote memory intact

## 2023-09-28 NOTE — BRIEF OPERATIVE NOTE - NSICDXBRIEFPROCEDURE_GEN_ALL_CORE_FT
PROCEDURES:  Supracervical abdominal hysterectomy with bilateral salpingo-oophorectomy 28-Sep-2023 18:39:27  Nhung Casas   PROCEDURES:  Supracervical abdominal hysterectomy with bilateral salpingo-oophorectomy 28-Sep-2023 18:39:27  Nhung aCsas

## 2023-09-28 NOTE — BRIEF OPERATIVE NOTE - NSICDXBRIEFPOSTOP_GEN_ALL_CORE_FT
POST-OP DIAGNOSIS:  Fibroid uterus 28-Sep-2023 18:42:17  Nhung Casas  Incisional hernia 28-Sep-2023 21:18:21  Jody Mcpherson  
POST-OP DIAGNOSIS:  Fibroid uterus 28-Sep-2023 18:42:17  Nhung Casas

## 2023-09-28 NOTE — PRE-ANESTHESIA EVALUATION ADULT - NSPROPOSEDPROCEDFT_GEN_ALL_CORE
exploratory laparotomy, extensive lysis of adhesiosn, open recurrent incarcerated 8cm hernia repair with mesh, bilateral component separation, staging laparoscopy, abdominal supracervical hysterectomy with right salpingectomy, left salingo-oophorectomy, cystoscopy, possible right salingo-oophorectomy,

## 2023-09-28 NOTE — H&P ADULT - ASSESSMENT
52 yo F PMH ovarian cyst, fibroid, inguinal hernia, extensive incisional hernia, PSH inguinal hernia repair, rhinoplasty, unilateral oophorectomy, appendectomy, presents on 9/28 for an incarcerated hernia repair with mesh, supracervical abdominal hysterectomy with bilateral salpingo-oophorectomy, cystoscopy with bilateral ureteral stent placement. Today patient states that she is feeling well with mild abdominal discomfort, she is cleared to proceed to surgery.    Proceed to OR 52 yo F PMH ovarian cyst, fibroid, inguinal hernia, extensive incisional hernia, PSH inguinal hernia repair, rhinoplasty, unilateral oophorectomy, appendectomy, presents on 9/28 for an incarcerated hernia repair with mesh, supracervical abdominal hysterectomy with bilateral salpingo-oophorectomy, cystoscopy with bilateral ureteral stent placement. Today patient states that she is feeling well with mild abdominal discomfort, she is cleared to proceed to surgery.    Proceed to OR  Admission to [facility]-op  ERAS protocol

## 2023-09-28 NOTE — H&P ADULT - HISTORY OF PRESENT ILLNESS
52 yo F PMH ovarian cyst, fibroid, inguinal hernia, extensive incisional hernia, PSH inguinal hernia repair, rhinoplasty, unilateral oophorectomy, appendectomy, presents on 9/28 for an incarcerated hernia repair with mesh, supracervical abdominal hysterectomy with bilateral salpingo-oophorectomy, cystoscopy with bilateral ureteral stent placement. Today patient states that she is feeling well with mild abdominal discomfort, she is cleared to proceed to surgery.   54 yo F PMH ovarian cyst, fibroid, inguinal hernia, extensive incisional hernia, PSH inguinal hernia repair, rhinoplasty, unilateral oophorectomy, appendectomy, IR drainage of ovarian cyst presents on 9/28 for an incarcerated hernia repair with mesh, supracervical abdominal hysterectomy with bilateral salpingo-oophorectomy, cystoscopy with bilateral ureteral stent placement. Patient states that she has had a complicated surgical history complicated by multiple incisional hernia repairs with mesh, she cannot state exactly how many. Her main complaint is discomfort caused by her uterine fibroid that she has had for years. Today patient states that she is feeling well with mild abdominal discomfort.

## 2023-09-28 NOTE — BRIEF OPERATIVE NOTE - NSICDXBRIEFPREOP_GEN_ALL_CORE_FT
PRE-OP DIAGNOSIS:  Fibroid uterus 28-Sep-2023 18:39:36  Nhung Casas  Incisional hernia 28-Sep-2023 21:18:12  Jody Mcpherson  
PRE-OP DIAGNOSIS:  Fibroid uterus 28-Sep-2023 18:39:36  Nhung Casas

## 2023-09-28 NOTE — PROGRESS NOTE ADULT - SUBJECTIVE AND OBJECTIVE BOX
Procedure: : incarcerated hernia repair with mesh, supracervical abdominal hysterectomy with bilateral salpingo-oophorectomy, cystoscopy with bilateral ureteral stent placement.    Surgeon: Dr. Granados    S: Pt has no complaints. Pain controlled with medication.  Denies CP, SOB, GREEN, calf tenderness.    O:  T(C): 37.1 (09-28-23 @ 20:47), Max: 37.1 (09-28-23 @ 20:47)  T(F): 98.7 (09-28-23 @ 20:47), Max: 98.7 (09-28-23 @ 20:47)  HR: 92 (09-28-23 @ 21:54) (92 - 99)  BP: 103/52 (09-28-23 @ 21:54) (96/51 - 107/56)  RR: 18 (09-28-23 @ 21:54) (12 - 20)  SpO2: 95% (09-28-23 @ 21:54) (95% - 98%)  Wt(kg): --            Gen: NAD, resting comfortably in bed  C/V: NSR  Pulm: Nonlabored breathing, no respiratory distress  Abd: soft, NT/ND Incision: c/d/i  Sebastian; yellow urine  Extrem: WWP, no calf edema, SCDs in place       Procedure: : incarcerated hernia repair with mesh, supracervical abdominal hysterectomy with bilateral salpingo-oophorectomy, cystoscopy with bilateral ureteral stent placement.    Surgeon: Dr. Granados    S: Pt has no complaints. Pain controlled with medication. Denies CP, SOB, GREEN, calf tenderness.    O:  T(C): 37.1 (09-28-23 @ 20:47), Max: 37.1 (09-28-23 @ 20:47)  T(F): 98.7 (09-28-23 @ 20:47), Max: 98.7 (09-28-23 @ 20:47)  HR: 92 (09-28-23 @ 21:54) (92 - 99)  BP: 103/52 (09-28-23 @ 21:54) (96/51 - 107/56)  RR: 18 (09-28-23 @ 21:54) (12 - 20)  SpO2: 95% (09-28-23 @ 21:54) (95% - 98%)  Wt(kg): --    Gen: NAD, resting comfortably in bed  C/V: NSR  Pulm: Nonlabored breathing, no respiratory distress  Abd: soft, NT/ND. No rebound or guarding  Incision: c/d/i  THA; x2 SS, to suction.    Sebastian; yellow urine  Extrem: WWP, no calf edema, SCDs in place

## 2023-09-28 NOTE — PROGRESS NOTE ADULT - ASSESSMENT
54 yo F PMH ovarian cyst, fibroid, inguinal hernia, extensive incisional hernia, PSH inguinal hernia repair, rhinoplasty, unilateral oophorectomy, appendectomy, presents on 9/28 for an incarcerated hernia repair with mesh, supracervical abdominal hysterectomy with bilateral salpingo-oophorectomy, cystoscopy with bilateral ureteral stent placement. POC WNL    Plan:  Ad[facility] to [facility]  ER[facility] protocol  CLD/IVF 40  SCD/SQH/OOBA/IS  Standing tylenol  AM labs  Sebastian  19 Fr devon  Gyn recs  Urology recs 52 yo F PMH ovarian cyst, fibroid, inguinal hernia, extensive incisional hernia, PSH inguinal hernia repair, rhinoplasty, unilateral oophorectomy, appendectomy, presents on 9/28 for an incarcerated hernia repair with mesh, supracervical abdominal hysterectomy with bilateral salpingo-oophorectomy, cystoscopy with bilateral ureteral stent placement. POC WNL    Plan:  ERAS protocol  CLD/IVF 40  SCD/SQH/OOBA/IS  Standing tylenol  AM labs  Sebastian  19 Fr devon  Gyn recs  Urology recs

## 2023-09-29 LAB
ANION GAP SERPL CALC-SCNC: 7 MMOL/L — SIGNIFICANT CHANGE UP (ref 5–17)
BUN SERPL-MCNC: 5 MG/DL — LOW (ref 7–23)
CALCIUM SERPL-MCNC: 8.9 MG/DL — SIGNIFICANT CHANGE UP (ref 8.4–10.5)
CHLORIDE SERPL-SCNC: 106 MMOL/L — SIGNIFICANT CHANGE UP (ref 96–108)
CO2 SERPL-SCNC: 29 MMOL/L — SIGNIFICANT CHANGE UP (ref 22–31)
CREAT SERPL-MCNC: 0.54 MG/DL — SIGNIFICANT CHANGE UP (ref 0.5–1.3)
EGFR: 111 ML/MIN/1.73M2 — SIGNIFICANT CHANGE UP
GLUCOSE SERPL-MCNC: 97 MG/DL — SIGNIFICANT CHANGE UP (ref 70–99)
HCT VFR BLD CALC: 38.8 % — SIGNIFICANT CHANGE UP (ref 34.5–45)
HCT VFR BLD CALC: 43.8 % — SIGNIFICANT CHANGE UP (ref 34.5–45)
HGB BLD-MCNC: 12.3 G/DL — SIGNIFICANT CHANGE UP (ref 11.5–15.5)
HGB BLD-MCNC: 13.7 G/DL — SIGNIFICANT CHANGE UP (ref 11.5–15.5)
MAGNESIUM SERPL-MCNC: 1.9 MG/DL — SIGNIFICANT CHANGE UP (ref 1.6–2.6)
MCHC RBC-ENTMCNC: 27.6 PG — SIGNIFICANT CHANGE UP (ref 27–34)
MCHC RBC-ENTMCNC: 27.9 PG — SIGNIFICANT CHANGE UP (ref 27–34)
MCHC RBC-ENTMCNC: 31.3 GM/DL — LOW (ref 32–36)
MCHC RBC-ENTMCNC: 31.7 GM/DL — LOW (ref 32–36)
MCV RBC AUTO: 88 FL — SIGNIFICANT CHANGE UP (ref 80–100)
MCV RBC AUTO: 88.3 FL — SIGNIFICANT CHANGE UP (ref 80–100)
NRBC # BLD: 0 /100 WBCS — SIGNIFICANT CHANGE UP (ref 0–0)
NRBC # BLD: 0 /100 WBCS — SIGNIFICANT CHANGE UP (ref 0–0)
PHOSPHATE SERPL-MCNC: 3.9 MG/DL — SIGNIFICANT CHANGE UP (ref 2.5–4.5)
PLATELET # BLD AUTO: 185 K/UL — SIGNIFICANT CHANGE UP (ref 150–400)
PLATELET # BLD AUTO: 222 K/UL — SIGNIFICANT CHANGE UP (ref 150–400)
POTASSIUM SERPL-MCNC: 3.9 MMOL/L — SIGNIFICANT CHANGE UP (ref 3.5–5.3)
POTASSIUM SERPL-SCNC: 3.9 MMOL/L — SIGNIFICANT CHANGE UP (ref 3.5–5.3)
RBC # BLD: 4.41 M/UL — SIGNIFICANT CHANGE UP (ref 3.8–5.2)
RBC # BLD: 4.96 M/UL — SIGNIFICANT CHANGE UP (ref 3.8–5.2)
RBC # FLD: 13.5 % — SIGNIFICANT CHANGE UP (ref 10.3–14.5)
RBC # FLD: 13.5 % — SIGNIFICANT CHANGE UP (ref 10.3–14.5)
SODIUM SERPL-SCNC: 142 MMOL/L — SIGNIFICANT CHANGE UP (ref 135–145)
WBC # BLD: 8.35 K/UL — SIGNIFICANT CHANGE UP (ref 3.8–10.5)
WBC # BLD: 8.57 K/UL — SIGNIFICANT CHANGE UP (ref 3.8–10.5)
WBC # FLD AUTO: 8.35 K/UL — SIGNIFICANT CHANGE UP (ref 3.8–10.5)
WBC # FLD AUTO: 8.57 K/UL — SIGNIFICANT CHANGE UP (ref 3.8–10.5)

## 2023-09-29 RX ORDER — LIDOCAINE HCL 20 MG/ML
5 VIAL (ML) INJECTION ONCE
Refills: 0 | Status: COMPLETED | OUTPATIENT
Start: 2023-09-29 | End: 2023-09-29

## 2023-09-29 RX ORDER — OXYCODONE HYDROCHLORIDE 5 MG/1
2.5 TABLET ORAL EVERY 8 HOURS
Refills: 0 | Status: DISCONTINUED | OUTPATIENT
Start: 2023-09-29 | End: 2023-10-01

## 2023-09-29 RX ORDER — OXYCODONE HYDROCHLORIDE 5 MG/1
5 TABLET ORAL EVERY 6 HOURS
Refills: 0 | Status: DISCONTINUED | OUTPATIENT
Start: 2023-09-29 | End: 2023-10-01

## 2023-09-29 RX ORDER — LIDOCAINE HCL 20 MG/ML
5 VIAL (ML) INJECTION ONCE
Refills: 0 | Status: DISCONTINUED | OUTPATIENT
Start: 2023-09-29 | End: 2023-09-29

## 2023-09-29 RX ADMIN — Medication 650 MILLIGRAM(S): at 06:07

## 2023-09-29 RX ADMIN — HEPARIN SODIUM 5000 UNIT(S): 5000 INJECTION INTRAVENOUS; SUBCUTANEOUS at 00:00

## 2023-09-29 RX ADMIN — OXYCODONE HYDROCHLORIDE 5 MILLIGRAM(S): 5 TABLET ORAL at 22:31

## 2023-09-29 RX ADMIN — HEPARIN SODIUM 5000 UNIT(S): 5000 INJECTION INTRAVENOUS; SUBCUTANEOUS at 21:55

## 2023-09-29 RX ADMIN — Medication 650 MILLIGRAM(S): at 00:00

## 2023-09-29 RX ADMIN — OXYCODONE HYDROCHLORIDE 10 MILLIGRAM(S): 5 TABLET ORAL at 07:11

## 2023-09-29 RX ADMIN — HEPARIN SODIUM 5000 UNIT(S): 5000 INJECTION INTRAVENOUS; SUBCUTANEOUS at 14:20

## 2023-09-29 RX ADMIN — SODIUM CHLORIDE 40 MILLILITER(S): 9 INJECTION, SOLUTION INTRAVENOUS at 12:17

## 2023-09-29 RX ADMIN — OXYCODONE HYDROCHLORIDE 5 MILLIGRAM(S): 5 TABLET ORAL at 14:50

## 2023-09-29 RX ADMIN — Medication 5 MILLILITER(S): at 18:17

## 2023-09-29 RX ADMIN — OXYCODONE HYDROCHLORIDE 5 MILLIGRAM(S): 5 TABLET ORAL at 13:51

## 2023-09-29 RX ADMIN — Medication 650 MILLIGRAM(S): at 18:26

## 2023-09-29 RX ADMIN — OXYCODONE HYDROCHLORIDE 10 MILLIGRAM(S): 5 TABLET ORAL at 08:11

## 2023-09-29 RX ADMIN — HEPARIN SODIUM 5000 UNIT(S): 5000 INJECTION INTRAVENOUS; SUBCUTANEOUS at 06:07

## 2023-09-29 RX ADMIN — Medication 650 MILLIGRAM(S): at 12:17

## 2023-09-29 RX ADMIN — OXYCODONE HYDROCHLORIDE 5 MILLIGRAM(S): 5 TABLET ORAL at 21:55

## 2023-09-29 NOTE — PROGRESS NOTE ADULT - SUBJECTIVE AND OBJECTIVE BOX
Patient evaluated at bedside. No acute events overnight. Patient denies chest pain, SOB, nausea, vomiting. Pain improved by medications, but not yet well controlled on medications. Sebastian is in place and she has not yet ambulated. Not yet passing flatus. Tolerating clear liquid diet.      T(C): 36.5 (09-29-23 @ 05:02), Max: 37.1 (09-28-23 @ 20:47)  HR: 92 (09-28-23 @ 23:51) (92 - 103)  BP: 92/57 (09-29-23 @ 05:02) (92/57 - 136/73)  RR: 17 (09-29-23 @ 05:02) (12 - 20)  SpO2: 99% (09-29-23 @ 05:02) (91% - 99%)  Wt(kg): --    Gen: Well-appearing. NAD.  Resp: Breathing comfortably on RA.  Abd: Soft, appropriately TTP post-op, non-distended, +BS  : Sebastian to gravity draining clear urine  Ext: No calf tenderness        09-28 @ 07:01  -  09-29 @ 06:53  --------------------------------------------------------  IN: 2900 mL / OUT: 2680 mL / NET: 220 mL            acetaminophen     Tablet .. 650 milliGRAM(s) Oral every 6 hours  heparin   Injectable 5000 Unit(s) SubCutaneous every 8 hours  HYDROmorphone  Injectable 0.25 milliGRAM(s) IV Push every 30 minutes PRN  lactated ringers. 1000 milliLiter(s) IV Continuous <Continuous>  ondansetron Injectable 4 milliGRAM(s) IV Push every 6 hours PRN  oxyCODONE    IR 5 milliGRAM(s) Oral every 6 hours PRN  oxyCODONE    IR 10 milliGRAM(s) Oral every 6 hours PRN

## 2023-09-29 NOTE — CHART NOTE - NSCHARTNOTEFT_GEN_A_CORE
Pt seen and examined at bedside. Pt complains of mild abdominal pain.   Pt denies any fever, chills, chest pain, SOB, nausea or vomiting     T(F): 98.3 (09-28-23 @ 23:51), Max: 98.7 (09-28-23 @ 20:47)  HR: 92 (09-28-23 @ 23:51) (92 - 103)  BP: 108/64 (09-28-23 @ 23:51) (96/51 - 136/73)  RR: 18 (09-28-23 @ 23:51) (12 - 20)  SpO2: 91% (09-28-23 @ 23:51) (91% - 98%)  Wt(kg): --    09-28 @ 07:01  -  09-29 @ 04:23  --------------------------------------------------------  IN: 2740 mL / OUT: 1650 mL / NET: 1090 mL        acetaminophen     Tablet .. 650 milliGRAM(s) Oral every 6 hours  heparin   Injectable 5000 Unit(s) SubCutaneous every 8 hours  HYDROmorphone  Injectable 0.25 milliGRAM(s) IV Push every 30 minutes PRN Severe Pain (7 - 10)  lactated ringers. 1000 milliLiter(s) IV Continuous <Continuous>  ondansetron Injectable 4 milliGRAM(s) IV Push every 6 hours PRN Nausea  oxyCODONE    IR 10 milliGRAM(s) Oral every 6 hours PRN Severe Pain (7 - 10)  oxyCODONE    IR 5 milliGRAM(s) Oral every 6 hours PRN Moderate Pain (4 - 6)      Physical exam:  Constitutional: NAD  Abdomen: incision site clean, dry and intact. Soft, mildly tender, nondistended. THA drain in place  Extremities: no lower extremity edema, or calve tenderness. SCDs in place    A:   53y, s/p s/p ex-lap AUGUSTINA/BSO, cysto+stents by urology, hernia repair w/ mesh by gen surg. Meeting postop milestones appropriately.     Plan:  1. Vital signs stable, continue to monitor per protocol  2. Pain control Tylenol/Toradol, Oxycodone PRN for BTP.  3. DVT prophylaxis: SCDs, Lovenox at   4. CV: IVH   5. Pulm: Incentive spirometer (at least 10 times per hour while awake)   6. GI: LF diet   7. : Partida in place, remove partida in AM  8. Follow up labs: AM CBC, BMP  9. Activity: bedrest tonight

## 2023-09-29 NOTE — PROGRESS NOTE ADULT - SUBJECTIVE AND OBJECTIVE BOX
UROLOGY PROGRESS NOTE    SUBJECTIVE: Patient seen and examined bedside. Patient reports feeling well, pain is well controlled, denies fevers/chills, nausea/vomiting.    heparin   Injectable 5000 Unit(s) SubCutaneous every 8 hours      Vital Signs Last 24 Hrs  T(C): 36.7 (29 Sep 2023 09:06), Max: 37.1 (28 Sep 2023 20:47)  T(F): 98 (29 Sep 2023 09:06), Max: 98.7 (28 Sep 2023 20:47)  HR: 77 (29 Sep 2023 09:06) (77 - 103)  BP: 97/64 (29 Sep 2023 09:06) (92/57 - 136/73)  BP(mean): 79 (28 Sep 2023 23:51) (66 - 79)  RR: 18 (29 Sep 2023 09:06) (12 - 20)  SpO2: 95% (29 Sep 2023 09:06) (91% - 99%)    Parameters below as of 29 Sep 2023 09:06  Patient On (Oxygen Delivery Method): room air      I&O's Detail    28 Sep 2023 07:01  -  29 Sep 2023 07:00  --------------------------------------------------------  IN:    Lactated Ringers: 2900 mL  Total IN: 2900 mL    OUT:    Blood Loss (mL): 500 mL    Bulb (mL): 210 mL    Indwelling Catheter - Urethral (mL): 1970 mL  Total OUT: 2680 mL    Total NET: 220 mL      29 Sep 2023 07:01  -  29 Sep 2023 10:57  --------------------------------------------------------  IN:    Oral Fluid: 200 mL  Total IN: 200 mL    OUT:  Total OUT: 0 mL    Total NET: 200 mL          PHYSICAL EXAM    General: NAD, resting comfortably in bed  C/V: NSR  Pulm: Nonlabored breathing, no respiratory distress on room air  Abd: soft, appropriately tender, midline incision c/d/i without strikethrough on dressing  Extrem: WWP, no edema, SCDs in place        LABS:                        13.7   8.35  )-----------( 222      ( 29 Sep 2023 08:36 )             43.8     09-29    142  |  106  |  5<L>  ----------------------------<  97  3.9   |  29  |  0.54    Ca    8.9      29 Sep 2023 08:36  Phos  3.9     09-29  Mg     1.9     09-29        Urinalysis Basic - ( 29 Sep 2023 08:36 )    Color: x / Appearance: x / SG: x / pH: x  Gluc: 97 mg/dL / Ketone: x  / Bili: x / Urobili: x   Blood: x / Protein: x / Nitrite: x   Leuk Esterase: x / RBC: x / WBC x   Sq Epi: x / Non Sq Epi: x / Bacteria: x        CULTURES:          RADIOLOGY & ADDITIONAL STUDIES:

## 2023-09-29 NOTE — PROGRESS NOTE ADULT - SUBJECTIVE AND OBJECTIVE BOX
SUBJECTIVE:      MEDICATIONS  (STANDING):  acetaminophen     Tablet .. 650 milliGRAM(s) Oral every 6 hours  heparin   Injectable 5000 Unit(s) SubCutaneous every 8 hours  lactated ringers. 1000 milliLiter(s) (40 mL/Hr) IV Continuous <Continuous>    MEDICATIONS  (PRN):  HYDROmorphone  Injectable 0.25 milliGRAM(s) IV Push every 30 minutes PRN Severe Pain (7 - 10)  ondansetron Injectable 4 milliGRAM(s) IV Push every 6 hours PRN Nausea  oxyCODONE    IR 10 milliGRAM(s) Oral every 6 hours PRN Severe Pain (7 - 10)  oxyCODONE    IR 5 milliGRAM(s) Oral every 6 hours PRN Moderate Pain (4 - 6)      Vital Signs Last 24 Hrs  T(C): 36.5 (29 Sep 2023 05:02), Max: 37.1 (28 Sep 2023 20:47)  T(F): 97.7 (29 Sep 2023 05:02), Max: 98.7 (28 Sep 2023 20:47)  HR: 92 (28 Sep 2023 23:51) (92 - 103)  BP: 92/57 (29 Sep 2023 05:02) (92/57 - 136/73)  BP(mean): 79 (28 Sep 2023 23:51) (66 - 79)  RR: 17 (29 Sep 2023 05:02) (12 - 20)  SpO2: 99% (29 Sep 2023 05:02) (91% - 99%)    Parameters below as of 29 Sep 2023 05:02  Patient On (Oxygen Delivery Method): room air        Physical Exam:  General: NAD, resting comfortably in bed  Pulmonary: Nonlabored breathing, no respiratory distress  Cardiovascular: NSR  Abdominal: soft, NT/ND  Extremities: WWP, normal strength  Neuro: A/O x 3, CNs II-XII grossly intact, no focal deficits    I&O's Summary    28 Sep 2023 07:01  -  29 Sep 2023 07:00  --------------------------------------------------------  IN: 2900 mL / OUT: 2680 mL / NET: 220 mL        LABS:              CAPILLARY BLOOD GLUCOSE            RADIOLOGY & ADDITIONAL STUDIES:   SUBJECTIVE:  Pt seen this AM on rounds. Pt endorses some dizziness o/n w/ no associated nausea/vomiting/diaphoresis. Pt denies passing gas/having bowel movements.     MEDICATIONS  (STANDING):  acetaminophen     Tablet .. 650 milliGRAM(s) Oral every 6 hours  heparin   Injectable 5000 Unit(s) SubCutaneous every 8 hours  lactated ringers. 1000 milliLiter(s) (40 mL/Hr) IV Continuous <Continuous>    MEDICATIONS  (PRN):  HYDROmorphone  Injectable 0.25 milliGRAM(s) IV Push every 30 minutes PRN Severe Pain (7 - 10)  ondansetron Injectable 4 milliGRAM(s) IV Push every 6 hours PRN Nausea  oxyCODONE    IR 10 milliGRAM(s) Oral every 6 hours PRN Severe Pain (7 - 10)  oxyCODONE    IR 5 milliGRAM(s) Oral every 6 hours PRN Moderate Pain (4 - 6)      Vital Signs Last 24 Hrs  T(C): 36.5 (29 Sep 2023 05:02), Max: 37.1 (28 Sep 2023 20:47)  T(F): 97.7 (29 Sep 2023 05:02), Max: 98.7 (28 Sep 2023 20:47)  HR: 92 (28 Sep 2023 23:51) (92 - 103)  BP: 92/57 (29 Sep 2023 05:02) (92/57 - 136/73)  BP(mean): 79 (28 Sep 2023 23:51) (66 - 79)  RR: 17 (29 Sep 2023 05:02) (12 - 20)  SpO2: 99% (29 Sep 2023 05:02) (91% - 99%)    Parameters below as of 29 Sep 2023 05:02  Patient On (Oxygen Delivery Method): room air        Physical Exam:  General: NAD, resting comfortably in bed  Pulmonary: Nonlabored breathing, no respiratory distress  Cardiovascular: NSR  Abdominal: soft, mildly tender ttp, nondistended   Extremities: WWP, normal strength  Neuro: A/O x 3, CNs II-XII grossly intact, no focal deficits    I&O's Summary    28 Sep 2023 07:01  -  29 Sep 2023 07:00  --------------------------------------------------------  IN: 2900 mL / OUT: 2680 mL / NET: 220 mL        LABS:              CAPILLARY BLOOD GLUCOSE            RADIOLOGY & ADDITIONAL STUDIES:

## 2023-09-29 NOTE — PROGRESS NOTE ADULT - ASSESSMENT
53y y.o. F POD#1 s/p ex-lap AUGUSTINA/BSO, ventral hernia repair, cystoscopy with stents admitted for pain control and postoperative monitoring.    - Recommend Toradol for improved pain control. Toradol has not been shown to increase postoperative bleeding.  - Post-op Hgb pending  - Ok to remove partida from gyn standpoint  - Remainder of care per primary team      Nhung Casas MD PGY4

## 2023-09-29 NOTE — PROGRESS NOTE ADULT - ASSESSMENT
54 yo F PMH polycythemia ovarian cyst, fibroid, inguinal hernia, extensive incisional hernia, PSH inguinal hernia repair, rhinoplasty, unilateral oophorectomy, appendectomy, s/p incarcerated hernia repair with mesh, supracervical abdominal hysterectomy with bilateral salpingo-oophorectomy, cystoscopy with bilateral ureteral stent placement 9/28. Sebastian and ureteral catheters removed this morning by primary team.     Recommendations:  -F/u trial of void  -Trend UOP, Cr  -Appreciate excellent care per primary team  -Discussed with attending

## 2023-09-30 LAB
ANION GAP SERPL CALC-SCNC: 8 MMOL/L — SIGNIFICANT CHANGE UP (ref 5–17)
BUN SERPL-MCNC: 4 MG/DL — LOW (ref 7–23)
CALCIUM SERPL-MCNC: 8.8 MG/DL — SIGNIFICANT CHANGE UP (ref 8.4–10.5)
CHLORIDE SERPL-SCNC: 107 MMOL/L — SIGNIFICANT CHANGE UP (ref 96–108)
CO2 SERPL-SCNC: 26 MMOL/L — SIGNIFICANT CHANGE UP (ref 22–31)
CREAT SERPL-MCNC: 0.46 MG/DL — LOW (ref 0.5–1.3)
EGFR: 115 ML/MIN/1.73M2 — SIGNIFICANT CHANGE UP
GLUCOSE SERPL-MCNC: 117 MG/DL — HIGH (ref 70–99)
HCT VFR BLD CALC: 41.2 % — SIGNIFICANT CHANGE UP (ref 34.5–45)
HGB BLD-MCNC: 13 G/DL — SIGNIFICANT CHANGE UP (ref 11.5–15.5)
MAGNESIUM SERPL-MCNC: 1.9 MG/DL — SIGNIFICANT CHANGE UP (ref 1.6–2.6)
MCHC RBC-ENTMCNC: 27.4 PG — SIGNIFICANT CHANGE UP (ref 27–34)
MCHC RBC-ENTMCNC: 31.6 GM/DL — LOW (ref 32–36)
MCV RBC AUTO: 86.7 FL — SIGNIFICANT CHANGE UP (ref 80–100)
NRBC # BLD: 0 /100 WBCS — SIGNIFICANT CHANGE UP (ref 0–0)
PHOSPHATE SERPL-MCNC: 2.3 MG/DL — LOW (ref 2.5–4.5)
PLATELET # BLD AUTO: 166 K/UL — SIGNIFICANT CHANGE UP (ref 150–400)
POTASSIUM SERPL-MCNC: 3.6 MMOL/L — SIGNIFICANT CHANGE UP (ref 3.5–5.3)
POTASSIUM SERPL-SCNC: 3.6 MMOL/L — SIGNIFICANT CHANGE UP (ref 3.5–5.3)
RBC # BLD: 4.75 M/UL — SIGNIFICANT CHANGE UP (ref 3.8–5.2)
RBC # FLD: 13.7 % — SIGNIFICANT CHANGE UP (ref 10.3–14.5)
SODIUM SERPL-SCNC: 141 MMOL/L — SIGNIFICANT CHANGE UP (ref 135–145)
WBC # BLD: 8.58 K/UL — SIGNIFICANT CHANGE UP (ref 3.8–10.5)
WBC # FLD AUTO: 8.58 K/UL — SIGNIFICANT CHANGE UP (ref 3.8–10.5)

## 2023-09-30 RX ORDER — POTASSIUM PHOSPHATE, MONOBASIC POTASSIUM PHOSPHATE, DIBASIC 236; 224 MG/ML; MG/ML
15 INJECTION, SOLUTION INTRAVENOUS ONCE
Refills: 0 | Status: COMPLETED | OUTPATIENT
Start: 2023-09-30 | End: 2023-09-30

## 2023-09-30 RX ORDER — MAGNESIUM SULFATE 500 MG/ML
1 VIAL (ML) INJECTION ONCE
Refills: 0 | Status: COMPLETED | OUTPATIENT
Start: 2023-09-30 | End: 2023-09-30

## 2023-09-30 RX ORDER — POTASSIUM CHLORIDE 20 MEQ
20 PACKET (EA) ORAL ONCE
Refills: 0 | Status: COMPLETED | OUTPATIENT
Start: 2023-09-30 | End: 2023-09-30

## 2023-09-30 RX ADMIN — OXYCODONE HYDROCHLORIDE 5 MILLIGRAM(S): 5 TABLET ORAL at 08:53

## 2023-09-30 RX ADMIN — SODIUM CHLORIDE 40 MILLILITER(S): 9 INJECTION, SOLUTION INTRAVENOUS at 00:46

## 2023-09-30 RX ADMIN — Medication 650 MILLIGRAM(S): at 05:41

## 2023-09-30 RX ADMIN — Medication 100 GRAM(S): at 11:40

## 2023-09-30 RX ADMIN — Medication 650 MILLIGRAM(S): at 11:40

## 2023-09-30 RX ADMIN — HEPARIN SODIUM 5000 UNIT(S): 5000 INJECTION INTRAVENOUS; SUBCUTANEOUS at 21:08

## 2023-09-30 RX ADMIN — OXYCODONE HYDROCHLORIDE 5 MILLIGRAM(S): 5 TABLET ORAL at 17:43

## 2023-09-30 RX ADMIN — Medication 650 MILLIGRAM(S): at 00:42

## 2023-09-30 RX ADMIN — POTASSIUM PHOSPHATE, MONOBASIC POTASSIUM PHOSPHATE, DIBASIC 62.5 MILLIMOLE(S): 236; 224 INJECTION, SOLUTION INTRAVENOUS at 12:42

## 2023-09-30 RX ADMIN — Medication 650 MILLIGRAM(S): at 23:00

## 2023-09-30 RX ADMIN — Medication 20 MILLIEQUIVALENT(S): at 11:40

## 2023-09-30 RX ADMIN — Medication 650 MILLIGRAM(S): at 17:43

## 2023-09-30 RX ADMIN — OXYCODONE HYDROCHLORIDE 5 MILLIGRAM(S): 5 TABLET ORAL at 18:45

## 2023-09-30 RX ADMIN — OXYCODONE HYDROCHLORIDE 5 MILLIGRAM(S): 5 TABLET ORAL at 09:55

## 2023-09-30 RX ADMIN — HEPARIN SODIUM 5000 UNIT(S): 5000 INJECTION INTRAVENOUS; SUBCUTANEOUS at 14:05

## 2023-09-30 RX ADMIN — HEPARIN SODIUM 5000 UNIT(S): 5000 INJECTION INTRAVENOUS; SUBCUTANEOUS at 05:41

## 2023-09-30 NOTE — PROGRESS NOTE ADULT - SUBJECTIVE AND OBJECTIVE BOX
STATUS POST:  Cystoscopic insertion of ureteral stent    Supracervical abdominal hysterectomy with bilateral salpingo-oophorectomy    Open repair of recurrent incisional hernia of anterior abdominal wall using synthetic mesh and posterior component separation technique        POST OPERATIVE DAY #:     SUBJECTIVE:     Vital Signs Last 24 Hrs  T(C): 37.2 (29 Sep 2023 20:15), Max: 37.2 (29 Sep 2023 20:15)  T(F): 98.9 (29 Sep 2023 20:15), Max: 98.9 (29 Sep 2023 20:15)  HR: 88 (29 Sep 2023 20:15) (77 - 88)  BP: 141/68 (29 Sep 2023 20:15) (97/64 - 141/68)  BP(mean): --  RR: 16 (29 Sep 2023 20:15) (16 - 18)  SpO2: 95% (29 Sep 2023 20:15) (93% - 96%)    Parameters below as of 29 Sep 2023 20:15  Patient On (Oxygen Delivery Method): room air        I&O's Summary    28 Sep 2023 07:01  -  29 Sep 2023 07:00  --------------------------------------------------------  IN: 2900 mL / OUT: 2680 mL / NET: 220 mL    29 Sep 2023 07:01  -  30 Sep 2023 05:31  --------------------------------------------------------  IN: 1430 mL / OUT: 3460 mL / NET: -2030 mL        Physical Exam:  General Appearance: Appears well, NAD  Pulmonary: Nonlabored breathing, no respiratory distress  Cardiovascular: NSR  Abdomen: Soft, nondisteded, appropriate incisional tenderness, dressings clean and dry and intact  Extremities: WWP, SCD's in place     LABS:                        12.3   8.57  )-----------( 185      ( 29 Sep 2023 12:59 )             38.8     09-29    142  |  106  |  5<L>  ----------------------------<  97  3.9   |  29  |  0.54    Ca    8.9      29 Sep 2023 08:36  Phos  3.9     09-29  Mg     1.9     09-29        Urinalysis Basic - ( 29 Sep 2023 08:36 )    Color: x / Appearance: x / SG: x / pH: x  Gluc: 97 mg/dL / Ketone: x  / Bili: x / Urobili: x   Blood: x / Protein: x / Nitrite: x   Leuk Esterase: x / RBC: x / WBC x   Sq Epi: x / Non Sq Epi: x / Bacteria: x       STATUS POST:  Cystoscopic insertion of ureteral stent    Supracervical abdominal hysterectomy with bilateral salpingo-oophorectomy    Open repair of recurrent incisional hernia of anterior abdominal wall using synthetic mesh and posterior component separation technique    POST OPERATIVE DAY #: 2    SUBJECTIVE: Pt seen and evaluated by the chief resident on am rounds. Pt doing well overnight. Pt endorses flatus but denies any bowel movements. Pt tolerating CLD.    Vital Signs Last 24 Hrs  T(C): 37.2 (29 Sep 2023 20:15), Max: 37.2 (29 Sep 2023 20:15)  T(F): 98.9 (29 Sep 2023 20:15), Max: 98.9 (29 Sep 2023 20:15)  HR: 88 (29 Sep 2023 20:15) (77 - 88)  BP: 141/68 (29 Sep 2023 20:15) (97/64 - 141/68)  BP(mean): --  RR: 16 (29 Sep 2023 20:15) (16 - 18)  SpO2: 95% (29 Sep 2023 20:15) (93% - 96%)    Parameters below as of 29 Sep 2023 20:15  Patient On (Oxygen Delivery Method): room air        I&O's Summary    28 Sep 2023 07:01  -  29 Sep 2023 07:00  --------------------------------------------------------  IN: 2900 mL / OUT: 2680 mL / NET: 220 mL    29 Sep 2023 07:01  -  30 Sep 2023 05:31  --------------------------------------------------------  IN: 1430 mL / OUT: 3460 mL / NET: -2030 mL        Physical Exam:  General Appearance: Appears well, NAD  Pulmonary: Nonlabored breathing, no respiratory distress  Cardiovascular: NSR  Abdomen: Soft, nondistended, appropriate incisional tenderness, dressings clean and dry and intact, THA with serosanguinous output   Extremities: WWP, SCD's in place     LABS:                        12.3   8.57  )-----------( 185      ( 29 Sep 2023 12:59 )             38.8     09-29    142  |  106  |  5<L>  ----------------------------<  97  3.9   |  29  |  0.54    Ca    8.9      29 Sep 2023 08:36  Phos  3.9     09-29  Mg     1.9     09-29        Urinalysis Basic - ( 29 Sep 2023 08:36 )    Color: x / Appearance: x / SG: x / pH: x  Gluc: 97 mg/dL / Ketone: x  / Bili: x / Urobili: x   Blood: x / Protein: x / Nitrite: x   Leuk Esterase: x / RBC: x / WBC x   Sq Epi: x / Non Sq Epi: x / Bacteria: x

## 2023-09-30 NOTE — PROGRESS NOTE ADULT - SUBJECTIVE AND OBJECTIVE BOX
Patient evaluated at the bedside. No acute events.  Denies CP/SOB/dizziness/nausea/vomiting/abdominal pain/calf pain.  Pain well controlled on oral pain medications. Patient is ambulating independently, passing flatus and tolerating a regular diet.    O:   T(C): 37 (09-30-23 @ 08:48), Max: 37.2 (09-29-23 @ 20:15)  HR: 82 (09-30-23 @ 08:48) (78 - 94)  BP: 132/79 (09-30-23 @ 08:48) (107/67 - 141/68)  RR: 18 (09-30-23 @ 08:48) (16 - 18)  SpO2: 95% (09-30-23 @ 08:48) (93% - 96%)  Wt(kg): --    GEN: patient appears well  LUNGS: no respiratory distress  ABD: soft, appropriately tender, non distended, incision clean and dry  EXT: no calf tenderness      09-29 @ 07:01  -  09-30 @ 07:00  --------------------------------------------------------  IN: 1430 mL / OUT: 3460 mL / NET: -2030 mL    09-30 @ 07:01  -  09-30 @ 10:19  --------------------------------------------------------  IN: 200 mL / OUT: 0 mL / NET: 200 mL      Creatinine: 0.46 mg/dL *L* (09-30-23 @ 05:30)  WBC Count: 8.58 K/uL (09-30-23 @ 05:30)  Hemoglobin: 13.0 g/dL (09-30-23 @ 05:30)

## 2023-09-30 NOTE — PROGRESS NOTE ADULT - ASSESSMENT
Assessment and Plan:   53y P2 with h/o fibroid uterus s/p ex-lap AUGUSTINA/BSO, cysto+stents by urology, hernia repair w/ mesh by gen surg. Meeting postop milestones appropriately.     Neuro: tylenol atc, oxy prn   CV: VSS   Resp: RA   GI/FEN: CLD, LR 40/hr, zofran prn, -/-   :  s/p partida, s/p stents   DVT ppx: SCD, SQH   Lines/drains: THA x 1   A+ 9/29     [ ] f/u AM labs    Continue care per primary team

## 2023-09-30 NOTE — PROGRESS NOTE ADULT - ASSESSMENT
54 yo F PMH ovarian cyst, fibroid, inguinal hernia, extensive incisional hernia, PSH inguinal hernia repair, rhinoplasty, unilateral oophorectomy, appendectomy, presents on 9/28 for an incarcerated hernia repair with mesh, supracervical abdominal hysterectomy with bilateral salpingo-oophorectomy, cystoscopy with bilateral ureteral stent placement.    Plan:  Admit to [facility]  [facility] protocol  CLD  SCD/SQH/OOBA/IS  Standing tylenol  AM labs  19 Fr devon  Gyn recs  Uro recs

## 2023-10-01 ENCOUNTER — TRANSCRIPTION ENCOUNTER (OUTPATIENT)
Age: 53
End: 2023-10-01

## 2023-10-01 VITALS
RESPIRATION RATE: 18 BRPM | HEART RATE: 82 BPM | TEMPERATURE: 100 F | DIASTOLIC BLOOD PRESSURE: 77 MMHG | OXYGEN SATURATION: 94 % | SYSTOLIC BLOOD PRESSURE: 120 MMHG

## 2023-10-01 LAB
ANION GAP SERPL CALC-SCNC: 7 MMOL/L — SIGNIFICANT CHANGE UP (ref 5–17)
BUN SERPL-MCNC: 4 MG/DL — LOW (ref 7–23)
CALCIUM SERPL-MCNC: 8.7 MG/DL — SIGNIFICANT CHANGE UP (ref 8.4–10.5)
CHLORIDE SERPL-SCNC: 104 MMOL/L — SIGNIFICANT CHANGE UP (ref 96–108)
CO2 SERPL-SCNC: 26 MMOL/L — SIGNIFICANT CHANGE UP (ref 22–31)
CREAT SERPL-MCNC: 0.47 MG/DL — LOW (ref 0.5–1.3)
EGFR: 114 ML/MIN/1.73M2 — SIGNIFICANT CHANGE UP
GLUCOSE SERPL-MCNC: 109 MG/DL — HIGH (ref 70–99)
HCT VFR BLD CALC: 40.7 % — SIGNIFICANT CHANGE UP (ref 34.5–45)
HGB BLD-MCNC: 13 G/DL — SIGNIFICANT CHANGE UP (ref 11.5–15.5)
MAGNESIUM SERPL-MCNC: 2.2 MG/DL — SIGNIFICANT CHANGE UP (ref 1.6–2.6)
MCHC RBC-ENTMCNC: 27.4 PG — SIGNIFICANT CHANGE UP (ref 27–34)
MCHC RBC-ENTMCNC: 31.9 GM/DL — LOW (ref 32–36)
MCV RBC AUTO: 85.7 FL — SIGNIFICANT CHANGE UP (ref 80–100)
NRBC # BLD: 0 /100 WBCS — SIGNIFICANT CHANGE UP (ref 0–0)
PHOSPHATE SERPL-MCNC: 2.3 MG/DL — LOW (ref 2.5–4.5)
PLATELET # BLD AUTO: 172 K/UL — SIGNIFICANT CHANGE UP (ref 150–400)
POTASSIUM SERPL-MCNC: 4 MMOL/L — SIGNIFICANT CHANGE UP (ref 3.5–5.3)
POTASSIUM SERPL-SCNC: 4 MMOL/L — SIGNIFICANT CHANGE UP (ref 3.5–5.3)
RBC # BLD: 4.75 M/UL — SIGNIFICANT CHANGE UP (ref 3.8–5.2)
RBC # FLD: 13.3 % — SIGNIFICANT CHANGE UP (ref 10.3–14.5)
SODIUM SERPL-SCNC: 137 MMOL/L — SIGNIFICANT CHANGE UP (ref 135–145)
WBC # BLD: 8.12 K/UL — SIGNIFICANT CHANGE UP (ref 3.8–10.5)
WBC # FLD AUTO: 8.12 K/UL — SIGNIFICANT CHANGE UP (ref 3.8–10.5)

## 2023-10-01 PROCEDURE — C1781: CPT

## 2023-10-01 PROCEDURE — 88307 TISSUE EXAM BY PATHOLOGIST: CPT

## 2023-10-01 PROCEDURE — 84100 ASSAY OF PHOSPHORUS: CPT

## 2023-10-01 PROCEDURE — 83735 ASSAY OF MAGNESIUM: CPT

## 2023-10-01 PROCEDURE — 86850 RBC ANTIBODY SCREEN: CPT

## 2023-10-01 PROCEDURE — 36415 COLL VENOUS BLD VENIPUNCTURE: CPT

## 2023-10-01 PROCEDURE — 85027 COMPLETE CBC AUTOMATED: CPT

## 2023-10-01 PROCEDURE — 86900 BLOOD TYPING SEROLOGIC ABO: CPT

## 2023-10-01 PROCEDURE — 86901 BLOOD TYPING SEROLOGIC RH(D): CPT

## 2023-10-01 PROCEDURE — 80048 BASIC METABOLIC PNL TOTAL CA: CPT

## 2023-10-01 PROCEDURE — 88304 TISSUE EXAM BY PATHOLOGIST: CPT

## 2023-10-01 PROCEDURE — C1765: CPT

## 2023-10-01 RX ORDER — SODIUM,POTASSIUM PHOSPHATES 278-250MG
2 POWDER IN PACKET (EA) ORAL ONCE
Refills: 0 | Status: COMPLETED | OUTPATIENT
Start: 2023-10-01 | End: 2023-10-01

## 2023-10-01 RX ORDER — ACETAMINOPHEN 500 MG
2 TABLET ORAL
Qty: 0 | Refills: 0 | DISCHARGE
Start: 2023-10-01

## 2023-10-01 RX ADMIN — HEPARIN SODIUM 5000 UNIT(S): 5000 INJECTION INTRAVENOUS; SUBCUTANEOUS at 05:08

## 2023-10-01 RX ADMIN — OXYCODONE HYDROCHLORIDE 5 MILLIGRAM(S): 5 TABLET ORAL at 09:50

## 2023-10-01 RX ADMIN — Medication 650 MILLIGRAM(S): at 17:31

## 2023-10-01 RX ADMIN — Medication 650 MILLIGRAM(S): at 11:33

## 2023-10-01 RX ADMIN — HEPARIN SODIUM 5000 UNIT(S): 5000 INJECTION INTRAVENOUS; SUBCUTANEOUS at 13:15

## 2023-10-01 RX ADMIN — OXYCODONE HYDROCHLORIDE 5 MILLIGRAM(S): 5 TABLET ORAL at 03:59

## 2023-10-01 RX ADMIN — OXYCODONE HYDROCHLORIDE 5 MILLIGRAM(S): 5 TABLET ORAL at 17:15

## 2023-10-01 RX ADMIN — Medication 650 MILLIGRAM(S): at 05:08

## 2023-10-01 RX ADMIN — OXYCODONE HYDROCHLORIDE 5 MILLIGRAM(S): 5 TABLET ORAL at 16:29

## 2023-10-01 RX ADMIN — OXYCODONE HYDROCHLORIDE 5 MILLIGRAM(S): 5 TABLET ORAL at 10:45

## 2023-10-01 RX ADMIN — Medication 2 PACKET(S): at 09:43

## 2023-10-01 RX ADMIN — OXYCODONE HYDROCHLORIDE 5 MILLIGRAM(S): 5 TABLET ORAL at 02:59

## 2023-10-01 NOTE — DISCHARGE NOTE PROVIDER - NSDCCPTREATMENT_GEN_ALL_CORE_FT
PRINCIPAL PROCEDURE  Procedure: Open repair of recurrent incisional hernia of anterior abdominal wall using synthetic mesh and posterior component separation technique  Findings and Treatment:       SECONDARY PROCEDURE  Procedure: Supracervical abdominal hysterectomy with bilateral salpingo-oophorectomy  Findings and Treatment:

## 2023-10-01 NOTE — DISCHARGE NOTE PROVIDER - NSDCFUADDINST_GEN_ALL_CORE_FT
Please follow up with Dr. Granados next week. Call his office to schedule an appointment: [phone].     General Discharge Instructions:  Please resume all regular home medications unless specifically advised not to take a particular medication. Also, please take any new medications as prescribed.  Please get plenty of rest, continue to ambulate several times per day, and drink adequate amounts of fluids. Avoid lifting weights greater than 5-10 lbs until you follow-up with your surgeon, who will instruct you further regarding activity restrictions.  Avoid driving or operating heavy machinery while taking pain medications.  Please follow-up with your surgeon and Primary Care Provider (PCP) as advised.  Incision Care:  *Please call your doctor or nurse practitioner if you have increased pain, swelling, redness, or drainage from the incision site.  *Avoid swimming and baths until your follow-up appointment.  *You may shower, and wash surgical incisions with a mild soap and warm water. Gently pat the area dry.  *If you have staples, they will be removed at your follow-up appointment.  *If you have steri-strips, they will fall off on their own. Please remove any remaining strips 7-10 days after surgery.     Warning Signs:  Please call your doctor or nurse practitioner if you experience the following:  *You experience new chest pain, pressure, squeezing or tightness.  *New or worsening cough, shortness of breath, or wheeze.  *If you are vomiting and cannot keep down fluids or your medications.  *You are getting dehydrated due to continued vomiting, diarrhea, or other reasons. Signs of dehydration include dry mouth, rapid heartbeat, or feeling dizzy or faint when standing.  *You see blood or dark/black material when you vomit or have a bowel movement.  *You experience burning when you urinate, have blood in your urine, or experience a discharge.  *Your pain is not improving within 8-12 hours or is not gone within 24 hours. Call or return immediately if your pain is getting worse, changes location, or moves to your chest or back.  *You have shaking chills, or fever greater than 101.5 degrees Fahrenheit or 38 degrees Celsius.  *Any change in your symptoms, or any new symptoms that concern you.     Please continue a LOW-FIBER DIET. Listed below are some foods you may eat and those you should avoid.   --Allowed foods:  White bread without nuts and seeds  White rice, plain white pasta, and crackers  Refined hot cereals, such as Cream of Wheat, or cold cereals with less than 1 gram of fiber per serving  Pancakes or waffles made from white refined flour  Most canned or well-cooked vegetables and fruits without skins or seeds  Fruit and vegetable juice with little or no pulp, fruit-flavored drinks, and flavored breen  Tender meat, poultry, fish, eggs and tofu  Milk and foods made from milk — such as yogurt, pudding, ice cream, cheeses and sour cream — if tolerated  Butter, margarine, oils and salad dressings without seeds  --Foods to avoid:  Whole-wheat or whole-grain breads, cereals and pasta  Brown or wild rice and other whole grains, such as oats, kasha, barley and quinoa  Dried fruits and prune juice  Raw fruit, including those with seeds, skin or membranes, such as berries  Raw or undercooked vegetables, including corn  Dried beans, peas and lentils  Seeds and nuts and foods containing them, including peanut butter and other nut butters  Coconut  Popcorn     Please take Tylenol 1000mg every 6 hours for pain. You may alternate every 3 hours with ibuprofen as needed.

## 2023-10-01 NOTE — DISCHARGE NOTE PROVIDER - NSDCCPCAREPLAN_GEN_ALL_CORE_FT
PRINCIPAL DISCHARGE DIAGNOSIS  Diagnosis: Incarcerated hernia  Assessment and Plan of Treatment:

## 2023-10-01 NOTE — DISCHARGE NOTE PROVIDER - CARE PROVIDER_API CALL
Chas Granados [facility]nejose  Surgery  [address] [place] [address], [address]  [place] [address]  Phone: [phone]  Fax: [phone]  Follow Up Time: 1 week    Judit Michael  Obstetrics and Gynecology  [address] [facility], [address]  [place] [id number]  Phone: [phone]  Fax: [phone]  Follow Up Time: 1 week

## 2023-10-01 NOTE — PROGRESS NOTE ADULT - ASSESSMENT
52 yo F PMH ovarian cyst, fibroid, inguinal hernia, extensive incisional hernia, PSH inguinal hernia repair, rhinoplasty, unilateral oophorectomy, appendectomy, presents on 9/28 for an incarcerated hernia repair with mesh, supracervical abdominal hysterectomy with bilateral salpingo-oophorectomy, cystoscopy with bilateral ureteral stent placement.    Plan:  [facility]  [facility]  Regular diet  SCD/SQH/OOBA/IS  Standing tylenol  AM labs  19 Fr devon  Gyn recs  Uro recs  d/c to home today (10/1)

## 2023-10-01 NOTE — DISCHARGE NOTE NURSING/CASE MANAGEMENT/SOCIAL WORK - PATIENT PORTAL LINK FT
You can access the FollowMyHealth Patient Portal offered by [facility] by registering at the following website: [email or web address]. By joining [facility]’s FollowMyHealth portal, you will also be able to view your health information using other applications (apps) compatible with our system.

## 2023-10-01 NOTE — PROGRESS NOTE ADULT - ASSESSMENT
53y P2 with h/o fibroid uterus s/p ex-lap AUGUSTINA/BSO, cysto+stents by urology, hernia repair w/ mesh by gen surg. Meeting postop milestones appropriately.     Neuro: tylenol atc, oxy prn   CV: VSS  Resp: RA   GI/FEN: CLD, LR 40/hr, zofran prn, +/-   :  s/p partida, s/p stents   DVT ppx: SCD, SQH       Care per primary team.

## 2023-10-01 NOTE — PROGRESS NOTE ADULT - SUBJECTIVE AND OBJECTIVE BOX
Pt seen and examined at bedside. Pt states mild abdominal pain. Pt ambulating, tolerating diet, +flatus.   Pt denies fever, chills, chest pain, SOB, nausea, vomiting, lightheadedness, dizziness.      T(F): 99.1 (10-01-23 @ 04:26), Max: 99.8 (09-30-23 @ 16:55)  HR: 80 (10-01-23 @ 04:26) (80 - 94)  BP: 120/62 (10-01-23 @ 04:26) (118/74 - 138/82)  RR: 18 (10-01-23 @ 04:26) (17 - 18)  SpO2: 93% (10-01-23 @ 04:26) (93% - 96%)  Wt(kg): --  I&O's Summary    30 Sep 2023 07:01  -  01 Oct 2023 07:00  --------------------------------------------------------  IN: 360 mL / OUT: 1100 mL / NET: -740 mL        MEDICATIONS  (STANDING):  acetaminophen     Tablet .. 650 milliGRAM(s) Oral every 6 hours  heparin   Injectable 5000 Unit(s) SubCutaneous every 8 hours  potassium phosphate / sodium phosphate Powder (PHOS-NaK) 2 Packet(s) Oral once    MEDICATIONS  (PRN):  ondansetron Injectable 4 milliGRAM(s) IV Push every 6 hours PRN Nausea  oxyCODONE    IR 5 milliGRAM(s) Oral every 6 hours PRN Severe Pain (7 - 10)  oxyCODONE    IR 2.5 milliGRAM(s) Oral every 8 hours PRN Moderate Pain (4 - 6)      Physical Exam:  Constitutional: NAD  Pulmonary: no increased WOB  Abdomen: incision site clean, dry, intact. Soft, mildly tender, nondistended, no guarding, no rebound, + bowel sounds  Extremities: no lower extremity edema or calf tenderness. SCDs in place     LABS:                        13.0   8.12  )-----------( 172      ( 01 Oct 2023 06:46 )             40.7     10-01    137  |  104  |  4<L>  ----------------------------<  109<H>  4.0   |  26  |  0.47<L>    Ca    8.7      01 Oct 2023 06:46  Phos  2.3     10-01  Mg     2.2     10-01        Urinalysis Basic - ( 01 Oct 2023 06:46 )    Color: x / Appearance: x / SG: x / pH: x  Gluc: 109 mg/dL / Ketone: x  / Bili: x / Urobili: x   Blood: x / Protein: x / Nitrite: x   Leuk Esterase: x / RBC: x / WBC x   Sq Epi: x / Non Sq Epi: x / Bacteria: x        RADIOLOGY & ADDITIONAL TESTS:

## 2023-10-01 NOTE — DISCHARGE NOTE PROVIDER - HOSPITAL COURSE
52 yo F PMH polycythemia ovarian cyst, fibroid, inguinal hernia, extensive incisional hernia, PSH inguinal hernia repair, rhinoplasty, unilateral oophorectomy, appendectomy, presents on 9/28 for an incarcerated hernia repair with mesh, supracervical abdominal hysterectomy with bilateral salpingo-oophorectomy, cystoscopy with bilateral ureteral stent placement.        9/28: incarcerated hernia repair with mesh, supracervical abdominal hysterectomy with bilateral salpingo-oophorectomy, cystoscopy with bilateral ureteral stent placement.  10/1:  ON: ERENDIRA  9/30: +f/-bm, started reg diet, THA removed  O/N: -F/-BM, voiding well, no dizziness, VSS, IVHL  9/29: partida out, -n/-v, -f/-bm, some dizziness SBP 97, orthostatics wnl, HH 13.7/49.4 (2pt drop from preop) CBC @ noon HH 12.3, -n/-v, c/o dizziness only after pain meds, PTOV, burping,  -f/-bm, THA drain is loose, placed stich w/ lido  ON: SBP 98 asym, 500cc bolus x1. repeat sbp 102. POC WNL  9/28: incarcerated hernia repair with mesh, supracervical abdominal hysterectomy with bilateral salpingo-oophorectomy, cystoscopy with bilateral ureteral stent placement. 52 yo F PMH polycythemia ovarian cyst, fibroid, inguinal hernia, extensive incisional hernia, PSH inguinal hernia repair, rhinoplasty, unilateral oophorectomy, appendectomy, presented on 9/28 for an incarcerated hernia repair with mesh, supracervical abdominal hysterectomy with bilateral salpingo-oophorectomy, cystoscopy with bilateral ureteral stent placement. Following the procedure her post operative check was within normal limits. Trial of void was passed on 9/29 following removal of the partida catheter. On 9/30 bowel function began to return with the passing of flatus. Her THA drain was also removed at this point.   Patient's post-operative course was uncomplicated. Diet was advanced as tolerated and pain was well controlled on medication. On day of discharge, pt deemed stable and ready to return home with plan to follow up as an outpatient.

## 2023-10-01 NOTE — PROGRESS NOTE ADULT - SUBJECTIVE AND OBJECTIVE BOX
POST-OP DAY: X s/p      SUBJECTIVE: Patient seen and examined bedside by Surgical resident.    heparin   Injectable 5000 Unit(s) SubCutaneous every 8 hours    MEDICATIONS  (PRN):  ondansetron Injectable 4 milliGRAM(s) IV Push every 6 hours PRN Nausea  oxyCODONE    IR 2.5 milliGRAM(s) Oral every 8 hours PRN Moderate Pain (4 - 6)  oxyCODONE    IR 5 milliGRAM(s) Oral every 6 hours PRN Severe Pain (7 - 10)      I&O's Detail    30 Sep 2023 07:01  -  01 Oct 2023 07:00  --------------------------------------------------------  IN:    Oral Fluid: 360 mL  Total IN: 360 mL    OUT:    Voided (mL): 1100 mL  Total OUT: 1100 mL    Total NET: -740 mL          Vital Signs Last 24 Hrs  T(C): 37.3 (01 Oct 2023 04:26), Max: 37.7 (30 Sep 2023 16:55)  T(F): 99.1 (01 Oct 2023 04:26), Max: 99.8 (30 Sep 2023 16:55)  HR: 80 (01 Oct 2023 04:26) (80 - 94)  BP: 120/62 (01 Oct 2023 04:26) (118/74 - 138/82)  BP(mean): --  RR: 18 (01 Oct 2023 04:26) (17 - 18)  SpO2: 93% (01 Oct 2023 04:26) (93% - 96%)    Parameters below as of 01 Oct 2023 04:26  Patient On (Oxygen Delivery Method): room air        General: NAD, resting comfortably in bed  C/V: NSR  Pulm: Nonlabored breathing, no respiratory distress  Abd: soft, NT/ND  Extrem: WWP, no edema, SCDs in place    LABS:                        13.0   8.12  )-----------( 172      ( 01 Oct 2023 06:46 )             40.7     10-01    137  |  104  |  4<L>  ----------------------------<  109<H>  4.0   |  26  |  0.47<L>    Ca    8.7      01 Oct 2023 06:46  Phos  2.3     10-01  Mg     2.2     10-01        Urinalysis Basic - ( 01 Oct 2023 06:46 )    Color: x / Appearance: x / SG: x / pH: x  Gluc: 109 mg/dL / Ketone: x  / Bili: x / Urobili: x   Blood: x / Protein: x / Nitrite: x   Leuk Esterase: x / RBC: x / WBC x   Sq Epi: x / Non Sq Epi: x / Bacteria: x        RADIOLOGY & ADDITIONAL STUDIES:     ON: ERENDIRA    S/P: Supracervical abdominal hysterectomy with bilateral salpingo-oophorectomy    Open repair of recurrent incisional hernia of anterior abdominal wall using synthetic mesh and posterior component separation technique      POST-OP DAY: 3      SUBJECTIVE: Patient seen and examined bedside by Surgical resident. Pt doing well this morning, tolerating PO liquid and solid diet. States that she is ready to go home tody, was with family who also stated that they are ready for her to come home as well. +F/BMs. Denies and n/v, SOB or chest pains at this time.     heparin   Injectable 5000 Unit(s) SubCutaneous every 8 hours    MEDICATIONS  (PRN):  ondansetron Injectable 4 milliGRAM(s) IV Push every 6 hours PRN Nausea  oxyCODONE    IR 2.5 milliGRAM(s) Oral every 8 hours PRN Moderate Pain (4 - 6)  oxyCODONE    IR 5 milliGRAM(s) Oral every 6 hours PRN Severe Pain (7 - 10)      I&O's Detail    30 Sep 2023 07:01  -  01 Oct 2023 07:00  --------------------------------------------------------  IN:    Oral Fluid: 360 mL  Total IN: 360 mL    OUT:    Voided (mL): 1100 mL  Total OUT: 1100 mL    Total NET: -740 mL          Vital Signs Last 24 Hrs  T(C): 37.3 (01 Oct 2023 04:26), Max: 37.7 (30 Sep 2023 16:55)  T(F): 99.1 (01 Oct 2023 04:26), Max: 99.8 (30 Sep 2023 16:55)  HR: 80 (01 Oct 2023 04:26) (80 - 94)  BP: 120/62 (01 Oct 2023 04:26) (118/74 - 138/82)  BP(mean): --  RR: 18 (01 Oct 2023 04:26) (17 - 18)  SpO2: 93% (01 Oct 2023 04:26) (93% - 96%)    Parameters below as of 01 Oct 2023 04:26  Patient On (Oxygen Delivery Method): room air        General: NAD, resting comfortably in bed  C/V: NSR  Pulm: Nonlabored breathing, no respiratory distress  Abd: soft, NT/ND  Extrem: WWP, no edema, SCDs in place    LABS:                        13.0   8.12  )-----------( 172      ( 01 Oct 2023 06:46 )             40.7     10-01    137  |  104  |  4<L>  ----------------------------<  109<H>  4.0   |  26  |  0.47<L>    Ca    8.7      01 Oct 2023 06:46  Phos  2.3     10-01  Mg     2.2     10-01        Urinalysis Basic - ( 01 Oct 2023 06:46 )    Color: x / Appearance: x / SG: x / pH: x  Gluc: 109 mg/dL / Ketone: x  / Bili: x / Urobili: x   Blood: x / Protein: x / Nitrite: x   Leuk Esterase: x / RBC: x / WBC x   Sq Epi: x / Non Sq Epi: x / Bacteria: x        RADIOLOGY & ADDITIONAL STUDIES:

## 2023-10-01 NOTE — DISCHARGE NOTE PROVIDER - PROVIDER TOKENS
PROVIDER:[TOKEN:[[id number]:[1 week]],PROVIDER:[TOKEN:[[id number]],FOLLOWUP:[1 week]]

## 2023-10-06 DIAGNOSIS — Z90.49 ACQUIRED ABSENCE OF OTHER SPECIFIED PARTS OF DIGESTIVE TRACT: ICD-10-CM

## 2023-10-06 DIAGNOSIS — L76.82 OTHER POSTPROCEDURAL COMPLICATIONS OF SKIN AND SUBCUTANEOUS TISSUE: ICD-10-CM

## 2023-10-06 DIAGNOSIS — Z86.16 PERSONAL HISTORY OF COVID-19: ICD-10-CM
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
## 2023-10-06 DIAGNOSIS — N83.202 UNSPECIFIED OVARIAN CYST, LEFT SIDE: ICD-10-CM

## 2023-10-06 DIAGNOSIS — D25.9 LEIOMYOMA OF UTERUS, UNSPECIFIED: ICD-10-CM

## 2023-10-06 DIAGNOSIS — K66.0 PERITONEAL ADHESIONS (POSTPROCEDURAL) (POSTINFECTION): ICD-10-CM

## 2023-10-06 DIAGNOSIS — T81.32XA DISRUPTION OF INTERNAL OPERATION (SURGICAL) WOUND, NOT ELSEWHERE CLASSIFIED, INITIAL ENCOUNTER: ICD-10-CM

## 2023-10-06 DIAGNOSIS — D75.1 SECONDARY POLYCYTHEMIA: ICD-10-CM

## 2023-10-06 DIAGNOSIS — K43.0 INCISIONAL HERNIA WITH OBSTRUCTION, WITHOUT GANGRENE: ICD-10-CM
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
## 2023-10-10 LAB — SURGICAL PATHOLOGY STUDY: SIGNIFICANT CHANGE UP

## 2024-12-25 PROBLEM — F10.90 ALCOHOL USE: Status: ACTIVE | Noted: 2022-04-08

## (undated) DEVICE — Device

## (undated) DEVICE — SUT PDS II 0 27" CT-1

## (undated) DEVICE — PACK EXPLORATORY LAPAROTOMY

## (undated) DEVICE — GLV 7 PROTEXIS (WHITE)

## (undated) DEVICE — PREP CHLORAPREP HI-LITE ORANGE 26ML

## (undated) DEVICE — PACK CYSTO

## (undated) DEVICE — POSITIONER STRAP KNEE & BODY 3X60" DISP

## (undated) DEVICE — SUT VICRYL 0 18" TIES

## (undated) DEVICE — SUT VICRYL 2-0 18" CT-1 UNDYED (POP-OFF)

## (undated) DEVICE — STAPLER SKIN PROXIMATE

## (undated) DEVICE — ELCTR BOVIE TIP BLADE VALLEYLAB 6.5"

## (undated) DEVICE — CATH ANGIOCATH 12G

## (undated) DEVICE — DRAPE LEGGINGS XL

## (undated) DEVICE — DRAPE 3/4 SHEET 52X76"

## (undated) DEVICE — DRAPE 1/2 SHEET 40X57"

## (undated) DEVICE — TUBING TUR 2 PRONG

## (undated) DEVICE — TUBING SUCTION 20FT

## (undated) DEVICE — WARMING BLANKET LOWER ADULT

## (undated) DEVICE — SUT PDS II 0 18" ENDOLOOP LIGATURE

## (undated) DEVICE — SUT VICRYL 2-0 27" SH

## (undated) DEVICE — LIGASURE IMPACT

## (undated) DEVICE — SUT VICRYL 0 36" CT-1

## (undated) DEVICE — WARMING BLANKET UPPER ADULT

## (undated) DEVICE — ELCTR BOVIE PENCIL SMOKE EVACUATION

## (undated) DEVICE — SUT VICRYL 1 18" CT-1 UNDYED (POP-OFF)

## (undated) DEVICE — MARKING PEN W RULER

## (undated) DEVICE — GOWN XL

## (undated) DEVICE — DRSG DERMABOND 0.7ML

## (undated) DEVICE — DRSG TELFA 3 X 8

## (undated) DEVICE — POSITIONER FOAM EGG CRATE ULNAR 2PCS (PINK)

## (undated) DEVICE — POSITIONER PINK PAD PIGAZZI SYSTEM XL W ARM PROTECTOR

## (undated) DEVICE — SUT SILK 0 18" TIES

## (undated) DEVICE — VENODYNE/SCD SLEEVE CALF MEDIUM

## (undated) DEVICE — SUT VICRYL 0 18" CT-1 UNDYED (POP-OFF)

## (undated) DEVICE — DRAPE TOWEL BLUE 17" X 24"

## (undated) DEVICE — SUT VICRYL 2-0 18" TIES

## (undated) DEVICE — SUT MONOCRYL 4-0 18" PS-2

## (undated) DEVICE — FOLEY TRAY 16FR 5CC LF UMETER CLOSED

## (undated) DEVICE — SUT PDS II 3-0 27" SH UNDYED

## (undated) DEVICE — GLV 8 PROTEXIS (WHITE)

## (undated) DEVICE — SUT VICRYL 3-0 27" SH

## (undated) DEVICE — STAPLER SKIN INSORB

## (undated) DEVICE — SUT PDS II 1 48" TP-1

## (undated) DEVICE — POOLE SUCTION TIP